# Patient Record
Sex: MALE | Race: WHITE | NOT HISPANIC OR LATINO | URBAN - METROPOLITAN AREA
[De-identification: names, ages, dates, MRNs, and addresses within clinical notes are randomized per-mention and may not be internally consistent; named-entity substitution may affect disease eponyms.]

---

## 2024-08-08 ENCOUNTER — REFERRAL (OUTPATIENT)
Dept: TRANSPLANT | Facility: CLINIC | Age: 64
End: 2024-08-08

## 2024-08-08 DIAGNOSIS — I25.10 CARDIOVASCULAR DISEASE: ICD-10-CM

## 2024-08-08 DIAGNOSIS — Z87.891 HISTORY OF TOBACCO USE: ICD-10-CM

## 2024-08-08 DIAGNOSIS — E11.9 DIABETES MELLITUS, TYPE 2 (H): ICD-10-CM

## 2024-08-08 DIAGNOSIS — Z76.82 ORGAN TRANSPLANT CANDIDATE: ICD-10-CM

## 2024-08-08 DIAGNOSIS — I10 ESSENTIAL HYPERTENSION: ICD-10-CM

## 2024-08-08 DIAGNOSIS — Z76.82 AWAITING ORGAN TRANSPLANT: Primary | ICD-10-CM

## 2024-08-08 DIAGNOSIS — G47.33 OBSTRUCTIVE SLEEP APNEA: ICD-10-CM

## 2024-08-08 DIAGNOSIS — E78.5 HYPERLIPIDEMIA: ICD-10-CM

## 2024-08-08 DIAGNOSIS — Z01.818 PRE-TRANSPLANT EVALUATION FOR KIDNEY TRANSPLANT: ICD-10-CM

## 2024-08-08 DIAGNOSIS — N18.4 CHRONIC KIDNEY DISEASE, STAGE IV (SEVERE) (H): ICD-10-CM

## 2024-08-08 DIAGNOSIS — N18.9 CHRONIC RENAL FAILURE: ICD-10-CM

## 2024-08-08 NOTE — LETTER
Zane King  [address] [facility] [address]          Dear Zane,    Thank you for your interest in the Transplant Center at [facility]. We look forward to being a part of your care team and assisting you through the transplant process.    As we discussed, your transplant coordinator is Michlele Colmenares (Kidney).  You may call your coordinator at any time with questions or concerns.  Your first scheduled call will be on 8/20/2024 between 12-3pm.  If this needs to change, call [phone].    Please complete the following.    Fill out and return the enclosed forms  Authorization for Electronic Communication  Authorization to Discuss Protected Health Information  Authorization for Release of Protected Health Information    Sign up for:  [employer], access to your electronic medical record (see enclosed pamphlet)  [employer], a transplant education website                                                                        My Transplant Place     You can use these tools to learn more about your transplant, communicate with your care team, and track your medical details      Sincerely,      Solid Organ Transplant  [facility]    cc: Referring Physician PCP

## 2024-08-08 NOTE — LETTER
Zane Leo King  [address] [facility] [address]                August 20, 2024    Mckinley Ontiveros,     It was a pleasure to speak with you over the phone today in preparation of your pre-kidney transplant evaluation. I am sending this email to review the pre-transplant information we covered.     A  from our Office will send your schedule in your My Chart for your pre-kidney transplant evaluation on November 11, 2024 starting at 7:30 AM. All your appointments will be at the:     [facility]  [address] [place] [id number]    For parking options, please park in the open lot across the street from the front door of our Clinic.  Otherwise, enter the [facility] / arrival plaza from [facility] and attendants can assist you based on your needs.  parking is available for those with limited mobility M-F from 7:00 am to 5:00 pm.     All in-person provider appointments will be on the third floor, 3A. Lab, EKG, and chest x-ray will be on the 1st floor. Echocardiogram will be on the 3rd floor. There are help desks in the clinic that can provide additional information, if you should need assistance.    Please bring your designated care person(s) to your appointment day to help listen to the patient education and ask questions that are important to you. You can eat/drink normally on this day. Please do not fast, as the appointment day will most likely go until 3 PM. There is a coffee shop on street level for you to purchase food and you can also bring food from home. Please be aware there are no microwaves or refrigerators for patient use at the clinic. Also, take all your prescribed medications as ordered on this day. There are no medication lab tests ordered during your appointments.    Upon completion of your appointments, I will compile the outcomes and have your results reviewed at the Transplant Team Selection Committee on Wednesday  of the following week. This is a medical review meeting only, you will not be asked to attend. I will call you within a few days after this meeting to inform you of the outcomes and to assist in planning for the completion of your evaluation.     You will receive an email from our Transplant Office which contains a Receipt of Information consent and patient educational materials. Please read and electronically sign the Receipt of Information consent as well as read the educational materials prior to your evaluation appointments on November 11, 2024.     Please complete your pre-kidney transplant education on My Transplant Place. This is an online website that our Transplant Program uses for patient education, specifically for our Program. To find My Transplant Place you can google search for it or click on this link: [email or web address].  You will find information for kidney transplant videos under the Organ Recipient Patient Education (Adult).  Find Kidney and select your preferred language.  The language link will send you to a [employer] video player. There are 15 short videos that are included in the SOT Pre-Transplant Training/Kidney 1. Please complete viewing of these videos prior to your evaluation appointments.  This will give you a good knowledge base to then to ask questions with the providers.       Additional transplant resources are as follows:   www.unos.org. UNOS, or United Network of Organ Sharing, is the national organization in our country that maintains all the organ wait lists and is responsible for the rules and regulations for organ allocation. I recommend looking at the Transplant Living section as this area has content created for patients.   www.srtr.org SRTR, or the Scientific Registry for Transplant Recipients is a national data base that all Transplant Centers report their success and failure rate for all organ types twice per year. The results are  public knowledge and do provide a good perspective of organ transplant.     If the transplant providers tell you at your appointments that you should start to have live donors register with our Program to initiate their evaluations, please provide this registration website: [email or web address]   The donor will receive a detailed email response back with information and next steps specific to their situation. It is important that the donor responds to the email in order to move forward with their evaluation. Donors can also call our Office and ask to speak with a live donor coordinator in the event of questions at [phone].     Please let me know of any questions or concerns.     Thank you,    Michelle Colmenares RN; Pre-Kidney/Pancreas Transplant Coordinator

## 2024-08-12 VITALS — BODY MASS INDEX: 37.52 KG/M2 | HEIGHT: 72 IN | WEIGHT: 277 LBS

## 2024-08-12 NOTE — TELEPHONE ENCOUNTER
SOT KIDNEY INTAKE   August 12, 2024 5:21 PM - Jaclyn Bran LPN:      PCP: Jewels Del Rio    Referring Organization: [facility]  Referring Provider: Zahida Lagos MD  Referring Diagnosis: CKD Stage 4    GFR/Date: 24 (1/30/2024)    Is patient diabetic? Yes  Is patient on insulin? Yes  Is patient under the age of 65? No  Was patient offered a pancreas transplant referral? 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 No   Does patient have a guardian? No    Referral intake process completed.  Patient is aware that after financial approval is received, medical records will be requested.   Patient confirmed for a callback from transplant coordinator on 8/20/2024. (within 2 weeks)  Tentative evaluation date 11/11/2024 slot 3 .    Confirmed coordinator will discuss evaluation process in more detail at the time of their call.   Patient is aware of the need to arrange age appropriate cancer screening, vaccinations, and dental care.  Reminded patient to complete questionnaire, complete medical records release, and review packet prior to evaluation visit .  Assessed patient for special needs (ie-wheelchair, assistance, guardian, and ): None    Patient instructed to call [phone] with questions.     Patient gave verbal consent during intake call to obtain medical records and documents outside of [facility]:  Yes

## 2024-08-19 ENCOUNTER — DOCUMENTATION ONLY (OUTPATIENT)
Dept: TRANSPLANT | Facility: CLINIC | Age: 64
End: 2024-08-19
Payer: COMMERCIAL

## 2024-08-20 NOTE — TELEPHONE ENCOUNTER
Reviewed pt's chart for pre-kidney transplant evaluation planning. Coordinator first call on 08/20/2024. PKE STD on 11/11/2024 slot C.      services required: No. Is pt able to attend virtual education class? Yes    Pt has CKD 4 2/2 , biopsy not performed. Qualifying GFR of 18 from February 2023 when the patient was hospitalized for several months with a COVID 19 infection.  Pt is not yet on dialysis. Pt is a type 2 diabetic, on insulin. He was diagnosed around 2005. Takes Lantus and 70/30 insulin twice daily. Last A1C January 2024 was 8.1.     Health hx: HLD, HTN, Hypogonadism, afib, DM2, Provoked DVT 2/2 COVID 19 and bedridden state. .    Heart hx: pafib, CAD s/p CABG 2016.  Watchman device placed. No AC.   Lung hx: MOMO.   Surgical hx: appendectomy as a child, penile implant, CABG in 2016.      Pt is not a smoker, does not consume alcohol, and does not use recreational drugs. Does not have current infection, non-healing wounds, or active cancer.    Health maintenance items: BMI 38.38 on 03/18/2024. Would be interested in participating in weight management as he has had a hard time losing weight with current strategies. Unable to add this visit to the PKE day. Colonoscopy: Due.  Dental: Due.  Last Vaccinations: Up to date pending virology panel.     Pt is independent w/ ADLs.  Pt lives in [place], [place] w/ his wife.  He feels he would have good support following transplant. Pt does not currently have any potential living donors.     Imaging Available: All three years old or older. Will need updated scans.     Contacted patient and introduced myself as their Transplant Coordinator, also introduced the role of the Transplant Coordinator in the transplant process.  Explained the purpose of this call including reviewing next steps and answering questions.      Confirmed Referring Provider, Yolis Teague; Dialysis Center, [facility]; and Primary Care Physician, Jewels Del Rio. Explained to the patient of the importance of  continued communication with referring providers and primary care physicians.      Reviewed components of transplant evaluation process including necessary appointments, tests, and procedures.  Instructed pt to bring 1-2 people with them to eval and to eat and drink and normally on eval day    Answered questions for patient regarding evaluation, provided my name and contact information and requested they call/message with any additional questions or concerns.  Informed patient they will receive a letter with information discussed in referral call. Determined that patient would like information regarding transplant by:       Mail, Email, [employer], and/or Phone Call.  Encouraged the use of [employer]    Informed pt about transplant educational website, asked to watch pre-kidney transplant videos or sign up for education class prior to evaluation. Link for educational videos were provided.  Additional informational web sites about transplant were discussed. Links provided to www.unos.org and www.srtr.org in letter sent to patient.  Pt expressed good understanding of all and were in good agreement with the plan.    Confirmed STD 11/11/2024 slot C. Informed pt they will hear from scheduling to arrange appointment times for evaluation day. As well as, receive an email from our Office prior to eval with a Receipt of Info and educational materials - instructed to read materials and sign consent prior to eval. Smartset orders entered.

## 2024-10-28 ENCOUNTER — TELEPHONE (OUTPATIENT)
Dept: TRANSPLANT | Facility: CLINIC | Age: 64
End: 2024-10-28
Payer: COMMERCIAL

## 2024-10-28 NOTE — TELEPHONE ENCOUNTER
GREGG 2-week call appointment confirmation:  Patient confirmed appt date/time: Yes  Support Person attending with Patient: Yes, Spouse  Insurance verification: Medicare  [facility] Reminder to bring ID and Insurance card to appointment   Services Needed: No, Language: English

## 2024-11-08 NOTE — PROGRESS NOTES
[facility] SOLID ORGAN TRANSPLANT  OUTPATIENT MNT: KIDNEY TRANSPLANT EVALUATION    Current BMI: 37.9 (HT 72 in,  lbs/127 kg)  BMI guideline for kidney transplant up to a BMI of 40 / per surgeon discretion     Frailty Assessment-- Not Frail (0/5 points)  Handgrip Strength: 38    Reference:  Score of 0-2 = Not Frail  Score of 3-5 = Frail      TIME SPENT: 30 minutes  VISIT TYPE: Initial   REFERRING PHYSICIAN: Kole   PT ACCOMPANIED BY: self     History of previous txp: none   Dialysis: no    NUTRITION ASSESSMENT  H/o DM II. He checks his BG 1x/week (fasting 101 last week).   Humulin 70/30, lantus (30 units)  Last A1c 9.2 (10/2024)- typically 6-7s     Was on ozempic for 6-12 months until 2-3 mos ago; reports he didn't like how he felt and did not lose weight  Was on another injectable drug as well     - Meal prep & grocery shopping: wife does some meal prep, but they often go out to eat   - Previous RD education: no  - Appetite: good/baseline  - Food allergies/intolerances: no  - Issues chewing or swallowing: no  - N/V/D/C: no  - Food access concerns: no    Vitamins, Supplements, Pertinent Meds: no  Herbal Medicines/Supplements: no  Protein Supplements: no    Weight hx // fluid retention:   - no [place]  - down 10 lbs intentionally x 1 year (at home 284-291), prev was >300 lbs     PHYSICAL ACTIVITY   Walks the dog 30 minutes/day- most days     DIET RECALL  Breakfast Waffles with PB and/or zero sugar syrup; cereal w/ [employer] milk   Lunch Hit or miss- similar to D    Dinner Out to eat most days- salad; soup & salad; steak   Some baked potato; vegetable if it comes with a meal- 50% of the time   Snacks Sugar free cookies, some donuts, some grapes/oranges/apples    Beverages Water, Diet 7up (1-2/day), some diet cranberry juice    Alcohol <1x/week    Dining out Most days      LABS  10/2 K 3.8   No recent Phos on file     NUTRITION DIAGNOSIS   No nutrition diagnosis identified at this time     NUTRITION  INTERVENTION   Nutrition education provided:  Discussed sodium intake (low sodium foods and drinks, seasoning food without salt and tips for low sodium diet).  Reviewed dining out in regard to sodium content of foods, general health with portion size, ongoing goal for weight loss, etc. Encouraged getting vegetables at most/all meals when dining out and saving half the portion for later.   Recommend protein food/snacks earlier in the day to help balance out meal consumption, etc.     Pt is not too interested in resuming injectable medications or weight loss surgery at this time.     Reviewed post txp diet guidelines in brief (will review in further detail post txp):  (1) Review of proper food safety measures d/t immunosuppressant therapy post-op and increased risk for food-borne illness    (2) Avoid the following post txp d/t risk for rejection, unknown effects on the organs, and/or potential interactions with immunosuppressants:  - Herbal, Chinese, holistic, chiropractic, natural, alternative medicines and supplements  - Detoxes and cleanses  - Weight loss pills  - Protein powders or other products with extracts or herbs (ie green tea extract)    (3) Med regimen and possible side effects    Patient Understanding: Pt verbalized understanding of education provided.  Expected Engagement: Good  Follow-Up Plans: PRN     NUTRITION GOALS   No nutrition goals identified at this time     Johanne Leonardo, RD, LD, CCTD

## 2024-11-10 LAB
ABO/RH(D): NORMAL
ANTIBODY SCREEN: NEGATIVE
SPECIMEN EXPIRATION DATE: NORMAL

## 2024-11-11 ENCOUNTER — APPOINTMENT (OUTPATIENT)
Dept: TRANSPLANT | Facility: CLINIC | Age: 64
End: 2024-11-11
Attending: NURSE PRACTITIONER
Payer: COMMERCIAL

## 2024-11-11 ENCOUNTER — ANCILLARY PROCEDURE (OUTPATIENT)
Dept: GENERAL RADIOLOGY | Facility: CLINIC | Age: 64
End: 2024-11-11
Attending: NURSE PRACTITIONER
Payer: COMMERCIAL

## 2024-11-11 ENCOUNTER — HOSPITAL ENCOUNTER (OUTPATIENT)
Dept: CARDIOLOGY | Facility: CLINIC | Age: 64
Discharge: HOME | End: 2024-11-11
Attending: NURSE PRACTITIONER
Payer: COMMERCIAL

## 2024-11-11 ENCOUNTER — ALLIED HEALTH/NURSE VISIT (OUTPATIENT)
Dept: TRANSPLANT | Facility: CLINIC | Age: 64
End: 2024-11-11

## 2024-11-11 ENCOUNTER — LAB (OUTPATIENT)
Dept: LAB | Facility: CLINIC | Age: 64
End: 2024-11-11
Attending: NURSE PRACTITIONER
Payer: COMMERCIAL

## 2024-11-11 VITALS
DIASTOLIC BLOOD PRESSURE: 76 MMHG | BODY MASS INDEX: 37.88 KG/M2 | HEART RATE: 75 BPM | HEIGHT: 72 IN | OXYGEN SATURATION: 95 % | SYSTOLIC BLOOD PRESSURE: 164 MMHG | WEIGHT: 279.7 LBS

## 2024-11-11 DIAGNOSIS — N18.4 CHRONIC KIDNEY DISEASE, STAGE IV (SEVERE) (H): ICD-10-CM

## 2024-11-11 DIAGNOSIS — Z01.818 PRE-TRANSPLANT EVALUATION FOR KIDNEY TRANSPLANT: ICD-10-CM

## 2024-11-11 DIAGNOSIS — Z12.5 ENCOUNTER FOR SCREENING FOR MALIGNANT NEOPLASM OF PROSTATE: ICD-10-CM

## 2024-11-11 DIAGNOSIS — N18.4 TYPE 2 DIABETES MELLITUS WITH STAGE 4 CHRONIC KIDNEY DISEASE, WITH LONG-TERM CURRENT USE OF INSULIN (H): ICD-10-CM

## 2024-11-11 DIAGNOSIS — Z79.4 TYPE 2 DIABETES MELLITUS WITH STAGE 4 CHRONIC KIDNEY DISEASE, WITH LONG-TERM CURRENT USE OF INSULIN (H): ICD-10-CM

## 2024-11-11 DIAGNOSIS — I25.810 CORONARY ARTERY DISEASE INVOLVING CORONARY BYPASS GRAFT OF NATIVE HEART WITHOUT ANGINA PECTORIS: ICD-10-CM

## 2024-11-11 DIAGNOSIS — Z87.891 HISTORY OF TOBACCO USE: ICD-10-CM

## 2024-11-11 DIAGNOSIS — N18.9 CHRONIC RENAL FAILURE: ICD-10-CM

## 2024-11-11 DIAGNOSIS — E78.5 HYPERLIPIDEMIA: ICD-10-CM

## 2024-11-11 DIAGNOSIS — E11.22 TYPE 2 DIABETES MELLITUS WITH STAGE 4 CHRONIC KIDNEY DISEASE, WITH LONG-TERM CURRENT USE OF INSULIN (H): ICD-10-CM

## 2024-11-11 DIAGNOSIS — Z76.82 ORGAN TRANSPLANT CANDIDATE: Primary | ICD-10-CM

## 2024-11-11 DIAGNOSIS — Z76.82 AWAITING ORGAN TRANSPLANT: ICD-10-CM

## 2024-11-11 DIAGNOSIS — E66.01 CLASS 2 SEVERE OBESITY DUE TO EXCESS CALORIES WITH SERIOUS COMORBIDITY AND BODY MASS INDEX (BMI) OF 37.0 TO 37.9 IN ADULT (H): ICD-10-CM

## 2024-11-11 DIAGNOSIS — N18.4 CHRONIC KIDNEY DISEASE, STAGE 4, SEVERELY DECREASED GFR (H): Primary | ICD-10-CM

## 2024-11-11 DIAGNOSIS — G47.33 OBSTRUCTIVE SLEEP APNEA: ICD-10-CM

## 2024-11-11 DIAGNOSIS — Z76.82 ORGAN TRANSPLANT CANDIDATE: ICD-10-CM

## 2024-11-11 DIAGNOSIS — E11.9 DIABETES MELLITUS, TYPE 2 (H): ICD-10-CM

## 2024-11-11 DIAGNOSIS — I25.10 CARDIOVASCULAR DISEASE: ICD-10-CM

## 2024-11-11 DIAGNOSIS — N18.4 CHRONIC KIDNEY DISEASE, STAGE 4, SEVERELY DECREASED GFR (H): ICD-10-CM

## 2024-11-11 DIAGNOSIS — I10 ESSENTIAL HYPERTENSION: ICD-10-CM

## 2024-11-11 DIAGNOSIS — Z01.818 PRE-TRANSPLANT EVALUATION FOR KIDNEY TRANSPLANT: Primary | ICD-10-CM

## 2024-11-11 DIAGNOSIS — Z11.59 ENCOUNTER FOR SCREENING FOR OTHER VIRAL DISEASES: ICD-10-CM

## 2024-11-11 DIAGNOSIS — E66.812 CLASS 2 SEVERE OBESITY DUE TO EXCESS CALORIES WITH SERIOUS COMORBIDITY AND BODY MASS INDEX (BMI) OF 37.0 TO 37.9 IN ADULT (H): ICD-10-CM

## 2024-11-11 LAB
A1 AB TITR SERPL: 64 {TITER}
A1 AB TITR SERPL: 8 {TITER}
ABO/RH(D): NORMAL
ALBUMIN SERPL BCG-MCNC: 4.3 G/DL (ref 3.5–5.2)
ALBUMIN UR-MCNC: 30 MG/DL
ALP SERPL-CCNC: 128 U/L (ref 40–150)
ALT SERPL W P-5'-P-CCNC: 19 U/L (ref 0–70)
ANION GAP SERPL CALCULATED.3IONS-SCNC: 16 MMOL/L (ref 7–15)
ANTIBODY TITER IGM SCREEN: NEGATIVE
APPEARANCE UR: CLEAR
APTT PPP: 32 SECONDS (ref 22–38)
AST SERPL W P-5'-P-CCNC: 20 U/L (ref 0–45)
B IGG TITR SERPL: 128 {TITER}
B IGM TITR SERPL: 16 {TITER}
BASOPHILS # BLD AUTO: 0.1 10E3/UL (ref 0–0.2)
BASOPHILS NFR BLD AUTO: 1 %
BILIRUB SERPL-MCNC: 0.6 MG/DL
BILIRUB UR QL STRIP: NEGATIVE
BUN SERPL-MCNC: 45.7 MG/DL (ref 8–23)
CALCIUM SERPL-MCNC: 9.4 MG/DL (ref 8.8–10.4)
CHLORIDE SERPL-SCNC: 104 MMOL/L (ref 98–107)
COLOR UR AUTO: ABNORMAL
CREAT SERPL-MCNC: 2.85 MG/DL (ref 0.67–1.17)
EGFRCR SERPLBLD CKD-EPI 2021: 24 ML/MIN/1.73M2
EOSINOPHIL # BLD AUTO: 0.1 10E3/UL (ref 0–0.7)
EOSINOPHIL NFR BLD AUTO: 2 %
ERYTHROCYTE [DISTWIDTH] IN BLOOD BY AUTOMATED COUNT: 14.1 % (ref 10–15)
EST. AVERAGE GLUCOSE BLD GHB EST-MCNC: 309 MG/DL
FACTOR 2 INTERPRETATION: NORMAL
FACTOR V INTERPRETATION: NORMAL
GLUCOSE SERPL-MCNC: 291 MG/DL (ref 70–99)
GLUCOSE UR STRIP-MCNC: 200 MG/DL
HBA1C MFR BLD: 12.4 %
HBV CORE AB SERPL QL IA: NONREACTIVE
HBV SURFACE AB SERPL IA-ACNC: <3.5 M[IU]/ML
HBV SURFACE AB SERPL IA-ACNC: NONREACTIVE M[IU]/ML
HBV SURFACE AG SERPL QL IA: NONREACTIVE
HCO3 SERPL-SCNC: 21 MMOL/L (ref 22–29)
HCT VFR BLD AUTO: 44.7 % (ref 40–53)
HCV AB SERPL QL IA: NONREACTIVE
HGB BLD-MCNC: 15.4 G/DL (ref 13.3–17.7)
HGB UR QL STRIP: ABNORMAL
HIV 1+2 AB+HIV1 P24 AG SERPL QL IA: NONREACTIVE
IMM GRANULOCYTES # BLD: 0.1 10E3/UL
IMM GRANULOCYTES NFR BLD: 1 %
INR PPP: 1.04 (ref 0.85–1.15)
KETONES UR STRIP-MCNC: NEGATIVE MG/DL
LAB DIRECTOR COMMENTS: NORMAL
LAB DIRECTOR DISCLAIMER: NORMAL
LAB DIRECTOR INTERPRETATION: NORMAL
LAB DIRECTOR METHODOLOGY: NORMAL
LAB DIRECTOR RESULTS: NORMAL
LEUKOCYTE ESTERASE UR QL STRIP: NEGATIVE
LOCATION OF TASK: NORMAL
LVEF ECHO: NORMAL
LYMPHOCYTES # BLD AUTO: 1.9 10E3/UL (ref 0.8–5.3)
LYMPHOCYTES NFR BLD AUTO: 20 %
MCH RBC QN AUTO: 29.7 PG (ref 26.5–33)
MCHC RBC AUTO-ENTMCNC: 34.5 G/DL (ref 31.5–36.5)
MCV RBC AUTO: 86 FL (ref 78–100)
MONOCYTES # BLD AUTO: 0.6 10E3/UL (ref 0–1.3)
MONOCYTES NFR BLD AUTO: 6 %
MUCOUS THREADS #/AREA URNS LPF: PRESENT /LPF
NEUTROPHILS # BLD AUTO: 6.7 10E3/UL (ref 1.6–8.3)
NEUTROPHILS NFR BLD AUTO: 71 %
NITRATE UR QL: NEGATIVE
NRBC # BLD AUTO: 0 10E3/UL
NRBC BLD AUTO-RTO: 0 /100
PH UR STRIP: 5.5 [PH] (ref 5–7)
PLATELET # BLD AUTO: 155 10E3/UL (ref 150–450)
POTASSIUM SERPL-SCNC: 3.4 MMOL/L (ref 3.4–5.3)
PROT SERPL-MCNC: 7.3 G/DL (ref 6.4–8.3)
PSA SERPL DL<=0.01 NG/ML-MCNC: 1.04 NG/ML (ref 0–4.5)
RBC # BLD AUTO: 5.19 10E6/UL (ref 4.4–5.9)
RBC URINE: 1 /HPF
SODIUM SERPL-SCNC: 141 MMOL/L (ref 135–145)
SP GR UR STRIP: 1.02 (ref 1–1.03)
SPECIMEN EXPIRATION DATE: NORMAL
SPECIMEN EXPIRATION DATE: NORMAL
SPECIMEN TYPE: NORMAL
T PALLIDUM AB SER QL: NONREACTIVE
UROBILINOGEN UR STRIP-MCNC: NORMAL MG/DL
WBC # BLD AUTO: 9.4 10E3/UL (ref 4–11)
WBC URINE: 0 /HPF

## 2024-11-11 PROCEDURE — 81001 URINALYSIS AUTO W/SCOPE: CPT

## 2024-11-11 PROCEDURE — G0463 HOSPITAL OUTPT CLINIC VISIT: HCPCS | Mod: 25 | Performed by: SURGERY

## 2024-11-11 PROCEDURE — 71046 X-RAY EXAM CHEST 2 VIEWS: CPT | Mod: GC | Performed by: RADIOLOGY

## 2024-11-11 PROCEDURE — G0103 PSA SCREENING: HCPCS

## 2024-11-11 PROCEDURE — 85025 COMPLETE CBC W/AUTO DIFF WBC: CPT

## 2024-11-11 PROCEDURE — 255N000002 HC RX 255 OP 636: Performed by: INTERNAL MEDICINE

## 2024-11-11 PROCEDURE — 999N000208 ECHOCARDIOGRAM COMPLETE

## 2024-11-11 PROCEDURE — 85610 PROTHROMBIN TIME: CPT

## 2024-11-11 PROCEDURE — 85390 FIBRINOLYSINS SCREEN I&R: CPT | Mod: 26 | Performed by: PATHOLOGY

## 2024-11-11 PROCEDURE — 83036 HEMOGLOBIN GLYCOSYLATED A1C: CPT

## 2024-11-11 PROCEDURE — 86704 HEP B CORE ANTIBODY TOTAL: CPT

## 2024-11-11 PROCEDURE — 85670 THROMBIN TIME PLASMA: CPT

## 2024-11-11 PROCEDURE — 81240 F2 GENE: CPT

## 2024-11-11 PROCEDURE — 86803 HEPATITIS C AB TEST: CPT

## 2024-11-11 PROCEDURE — 86787 VARICELLA-ZOSTER ANTIBODY: CPT

## 2024-11-11 PROCEDURE — 86706 HEP B SURFACE ANTIBODY: CPT

## 2024-11-11 PROCEDURE — 86886 COOMBS TEST INDIRECT TITER: CPT

## 2024-11-11 PROCEDURE — 86665 EPSTEIN-BARR CAPSID VCA: CPT

## 2024-11-11 PROCEDURE — G0452 MOLECULAR PATHOLOGY INTERPR: HCPCS | Mod: 26 | Performed by: PATHOLOGY

## 2024-11-11 PROCEDURE — 86644 CMV ANTIBODY: CPT

## 2024-11-11 PROCEDURE — 82040 ASSAY OF SERUM ALBUMIN: CPT

## 2024-11-11 PROCEDURE — 86780 TREPONEMA PALLIDUM: CPT

## 2024-11-11 PROCEDURE — 85730 THROMBOPLASTIN TIME PARTIAL: CPT

## 2024-11-11 PROCEDURE — 86481 TB AG RESPONSE T-CELL SUSP: CPT

## 2024-11-11 PROCEDURE — 81382 HLA II TYPING 1 LOC HR: CPT

## 2024-11-11 PROCEDURE — 82247 BILIRUBIN TOTAL: CPT

## 2024-11-11 PROCEDURE — 99204 OFFICE O/P NEW MOD 45 MIN: CPT | Performed by: SURGERY

## 2024-11-11 PROCEDURE — 85613 RUSSELL VIPER VENOM DILUTED: CPT

## 2024-11-11 PROCEDURE — 86147 CARDIOLIPIN ANTIBODY EA IG: CPT

## 2024-11-11 PROCEDURE — 86850 RBC ANTIBODY SCREEN: CPT

## 2024-11-11 PROCEDURE — 85014 HEMATOCRIT: CPT

## 2024-11-11 PROCEDURE — 84681 ASSAY OF C-PEPTIDE: CPT

## 2024-11-11 PROCEDURE — 93306 TTE W/DOPPLER COMPLETE: CPT | Mod: 26 | Performed by: INTERNAL MEDICINE

## 2024-11-11 PROCEDURE — 81379 HLA I TYPING COMPLETE HR: CPT

## 2024-11-11 PROCEDURE — 93000 ELECTROCARDIOGRAM COMPLETE: CPT | Performed by: INTERNAL MEDICINE

## 2024-11-11 PROCEDURE — 86900 BLOOD TYPING SEROLOGIC ABO: CPT

## 2024-11-11 PROCEDURE — 87340 HEPATITIS B SURFACE AG IA: CPT

## 2024-11-11 PROCEDURE — 36415 COLL VENOUS BLD VENIPUNCTURE: CPT

## 2024-11-11 RX ORDER — AMLODIPINE BESYLATE 10 MG/1
1 TABLET ORAL DAILY
COMMUNITY
Start: 2024-06-12

## 2024-11-11 RX ORDER — GABAPENTIN 100 MG/1
1 CAPSULE ORAL AT BEDTIME
COMMUNITY
Start: 2024-06-12

## 2024-11-11 RX ORDER — CHLORTHALIDONE 25 MG/1
25 TABLET ORAL
COMMUNITY
Start: 2024-04-24

## 2024-11-11 RX ORDER — CHOLECALCIFEROL (VITAMIN D3) 50 MCG
2000 TABLET ORAL
COMMUNITY
Start: 2024-01-29 | End: 2025-11-04

## 2024-11-11 RX ORDER — ATORVASTATIN CALCIUM 40 MG/1
1 TABLET, FILM COATED ORAL AT BEDTIME
COMMUNITY
Start: 2023-08-02

## 2024-11-11 RX ORDER — GUAIFENESIN AND DEXTROMETHORPHAN HYDROBROMIDE 100; 10 MG/5ML; MG/5ML
10 SOLUTION ORAL
COMMUNITY
Start: 2024-10-03

## 2024-11-11 RX ORDER — METOPROLOL SUCCINATE 100 MG/1
100 TABLET, EXTENDED RELEASE ORAL
COMMUNITY
Start: 2024-07-01

## 2024-11-11 RX ORDER — TESTOSTERONE 20.25 MG/1.25G
GEL TOPICAL
COMMUNITY
Start: 2023-11-16

## 2024-11-11 RX ORDER — CALCITRIOL 0.25 UG/1
0.25 CAPSULE, LIQUID FILLED ORAL
COMMUNITY
Start: 2024-08-07 | End: 2025-08-07

## 2024-11-11 RX ORDER — ASPIRIN 81 MG/1
81 TABLET ORAL
COMMUNITY

## 2024-11-11 RX ORDER — INSULIN GLARGINE 100 [IU]/ML
30 INJECTION, SOLUTION SUBCUTANEOUS AT BEDTIME
COMMUNITY

## 2024-11-11 RX ORDER — AMLODIPINE BESYLATE AND ATORVASTATIN CALCIUM 10; 10 MG/1; MG/1
TABLET, FILM COATED ORAL
COMMUNITY

## 2024-11-11 RX ADMIN — PERFLUTREN 4 ML: 6.52 INJECTION, SUSPENSION INTRAVENOUS at 15:26

## 2024-11-11 NOTE — NURSING NOTE
"Kidney Transplant Evaluation     Participants were informed of the benefits of transplant as well as potential risks such as infection, cancer, and death.  The need for total adherence with immunosuppression medications and following transplant regimens was stressed.  The overall evaluation/approval/listing process was reviewed.        The patient was provided with the following documents:  What You Need to Know About a Kidney Transplant  Adult Kidney Transplant - A Guide for Patients  SRTR Data Sheet - Kidney  Brochure - Kidney Allocation  Brochure - Multiple Listing and Waiting Time Transfer  What Every Patient Needs to Know (UNOS)  UNOS Facts and Figures  PI Consent  Receipt of Information form    Signed the  Receipt of Information for Organ Transplant Recipient.\" He was provided transplant coordinator's business card and instructed to call with additional questions.      Summary    Team s concerns/comments: Weight loss - goal to 265 lbs and re-evaluate per surgery, cardiac risk assessment, health maintenance needs to be updated    Candidacy category: No data was found    Action/Plan: continue with evaluation    Expected Selection Meeting Discussion: 11/20/2024    "

## 2024-11-11 NOTE — LETTER
11/11/2024      Zane King  [address] [facility] [place] [address]      Dear Colleague,    Thank you for referring your patient, Zane King, to the [facility]. Please see a copy of my visit note below.    Transplant Surgery Consult Note    Medical record number: [id number]  YOB: 1960,   Consult requested by Dr. Teague for evaluation of kidney transplant candidacy.    Assessment and Recommendations: Mr. King is a good candidate for transplantation and has a good understanding of the risks and benefits of this approach to management of renal failure and diabetes. The following issues should be addressed prior to transplant:     Mr. King has Chronic renal failure due to diabetes mellitus type 2 whose condition is not expected to resolve, is expected to progress, and is expected to continue to develop related comorbid conditions.  Cardiology consult for cardiac risk stratification to be ordered: Yes  CT abdomen and pelvis without contrast to be ordered for assessment of vascular targets: Yes  Transplant listing labs ordered to include HLA, ABOx2, Cr, etc.  Dietician consult ordered: Yes  Social work consult ordered: Yes  Imaging reports reviewed: No recent imaging available for review  Radiology images reviewed: No recent imaging available for review  Recipient suitable to move forward with work up of living donors:  Yes  Needs to improve HgbA1c to <8.5%  Derm recommendations on waiting time. Recently had melanoma excised on R ear  Updated colonoscopy  Lose weight down to 265 and reassess  Weight management clinic for assistance with weight loss.  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  Was on anticoagulation for that and atrial fibrillation however has had Watchman procedure and now off anticoagulation.  Question of anticoagulation associated nephropathy  however there were no red casts on the biopsy and therefore this was in question.  He does note an improvement in renal function with cessation of warfarin.  Will likely need chemical DVT prophylaxis at the time of surgery given his history of provoked DVTs.  Patient states he was worked up for hypercoagulable disorder however I cannot find the notes from this.  Would appreciate reviewing hematology workup for hypercoagulability.  If he has not had this he should see our hematologist.      The majority of our visit was spent in counselling, discussing the medical and surgical risks of living or  donor kidney and pancreas transplantation, either in a simultaneous or sequential fashion. I contrasted approximate wait time for SPK vs living vs  donor kidneys from normal (0-85%) or higher (%) kidney donor profile index (KDPI) donors and their associated outcomes. I would not recommend this individual to consider kidneys from high KDPI donors. The reason for this decision is best summarized as: not on dialysis yet.  Access to transplant will be impacted by living donor availability and overall candidacy for SPK, as well as the influence of blood type and degree of sensitization. We discussed advantages of preemptive transplant as well as living donor kidney transplant, and graft and patient survival outcomes associated with these options. Potential surgical complications of kidney and pancreas transplantation include bleeding, clotting, infection, wound complications, anastomotic failure and other issues such as cardiac complications, pneumonia, deep venous thrombosis, pulmonary embolism, post transplant diabetes and death. We discussed the need for protocol biopsy of the kidney and the possible need for a ureteral stent (and subsequent removal). We discussed benefits and risks associated with different approaches to exocrine drainage of pancreatic secretions. We also discussed differences in the  "average length of stay, recovery process, and posttransplant lab and monitoring protocol. We discussed the risk of graft rejection and recurrent diabetic nephropathy in the setting of poor glycemic control. I emphasized the need for strict immunosuppression adherence and the potential for complications of immunosuppression such as skin cancer or lymphoma, as well as a very low but not zero risk of donor-derived disease transmission risks (infection, cancer). Mr. King asked good questions and the patient's candidacy will be reviewed at our Multidisciplinary Selection Committee. Thank you for the opportunity to participate in Mr. King's care.    Total time: 60 minutes        Aparna Sharif MD FACS  Associate Professor of Surgery  Director, Living Kidney Donor Program.  ---------------------------------------------------------------------------------------------------    HPI: Mr. King has Chronic renal failure due to diabetes mellitus type 2. The patient has had diabetes for 19 years. Management is by Lantus 30 units at bedtime  Novolog Mix 70-30 30 units twice daily . The patient usually checks his blood sugar 1 times every 2 weeks.  Daily blood glucoses range typically from 80 to \"I don't know\".  Hypoglyemic unawareness is not an issue.  The diabetes is uncontrolled.    Complications of diabetes include:    Retinopathy:  No  Neuropathy: No  Gastroparesis:  No    The patient is not on dialysis.    Has potential kidney donors:  Yes .  Interested in participation in paired exchange if a donor is willing: Yes     The patient has the following pertinent history:       No    Yes  Dialysis:    [x]      [] via:       Blood Transfusion                  [x]      []  Number of units:   Most recently:  Pregnancy:    [x]      [] Number:       Previous Transplant:  [x]      [] Details:    Cancer    []      [x] Comment: melanoma R ear  Kidney stones   [x]      [] Comment:      Recurrent infections  [x]      [] "  Type:                  Bladder dysfunction  [x]      [] Cause:    Claudication   [x]      [] Distance:    Previous Amputation  [x]      [] Cause:     Chronic anticoagulation  [x]      [] Indication:  Congregational  [x]      []     Past Medical History:   Diagnosis Date     Diabetes (H)      Hypertension      Past Surgical History:   Procedure Laterality Date     IR CVC TUNNEL W2 CATH W/O PORT  5/10/2021     IR LUMBAR PUNCTURE  5/7/2021   CABG x 4 2015  Umbilical hernia repair with mesh  Penile implant  Coronary stenting    Family History   Problem Relation Age of Onset     Coronary Artery Disease Father      Diabetes Type 2  Maternal Grandmother      Social History     Socioeconomic History     Marital status:      Spouse name: Not on file     Number of children: Not on file     Years of education: Not on file     Highest education level: Not on file   Occupational History     Not on file   Tobacco Use     Smoking status: Former     Types: Cigarettes     Smokeless tobacco: Never     Tobacco comments:     Quit smoking 18-20 years ago    Substance and Sexual Activity     Alcohol use: Yes     Comment: Rarely, 1 drink evry 2-3wks     Drug use: Never     Sexual activity: Not on file   Other Topics Concern     Parent/sibling w/ CABG, MI or angioplasty before 65F 55M? No   Social History Narrative     Not on file     Social Drivers of Health     Financial Resource Strain: Low Risk  (10/2/2024)    [facility]    Financial Resource Strain      Difficulty of Paying Living Expenses: 3      Difficulty of Paying Living Expenses: Not on file   Food Insecurity: No Food Insecurity (10/2/2024)    [facility]    Food Insecurity      Do you worry your food will run out before you are able to buy more?: 1   Transportation Needs: No Transportation Needs (10/2/2024)    [facility]     Transportation Needs      Does lack of transportation keep you from medical appointments?: 1      Does lack of transportation keep you from work, meetings or getting things that you need?: 1   Physical Activity: Not on file   Stress: Not on file   Social Connections: Socially Integrated (10/2/2024)    [facility]    Social Connections      Do you often feel lonely or isolated from those around you?: 0   Interpersonal Safety: Unknown (2/6/2024)    Received from [facility]    Humiliation, Afraid, Rape, and Kick questionnaire      Fear of Current or Ex-Partner: Not on file      Emotionally Abused: Not on file      Physically Abused: No      Sexually Abused: No   Housing Stability: Low Risk  (10/2/2024)    Received from [facility]    Housing Stability      What is your housing situation today?: 1       ROS:   CONSTITUTIONAL:  No fevers or chills  EYES: negative for icterus  ENT:  negative for hearing loss, tinnitus and sore throat  RESPIRATORY:  negative for cough, sputum, dyspnea  CARDIOVASCULAR:  negative for chest pain Fatigue  GASTROINTESTINAL:  negative for nausea, vomiting, diarrhea or constipation  GENITOURINARY:  negative for incontinence, dysuria, bladder emptying problems  HEME:  No easy bruising  INTEGUMENT:  negative for rash and pruritus  NEURO:  Negative for headache, seizure disorder    Allergies:   No Known Allergies    Medications:  Prescription Medications as of 11/11/2024         Rx Number Disp Refills Start End Last Dispensed Date Next Fill Date Owning Pharmacy    amLODIPine (NORVASC) 10 MG tablet  -- -- 6/12/2024 --       Sig: Take 1 tablet by mouth daily.    Class: Historical    Route: Oral    amLODIPine-atorvastatin (CADUET) 10-10 MG tablet  -- --  --       Class: Historical    aspirin 81 MG EC tablet  -- --  --       Sig: Take 81 mg by mouth.    Class: Historical    Route: Oral    atorvastatin (LIPITOR) 40 MG  tablet  -- -- 8/2/2023 --       Sig: Take 1 tablet by mouth at bedtime.    Class: Historical    Route: Oral    calcitRIOL (ROCALTROL) 0.25 MCG capsule  -- -- 8/7/2024 8/7/2025       Sig: Take 0.25 mcg by mouth.    Class: Historical    Route: Oral    chlorthalidone (HYGROTON) 25 MG tablet  -- -- 4/24/2024 --       Sig: Take 25 mg by mouth.    Class: Historical    Route: Oral    Dextromethorphan-guaiFENesin  MG/5ML syrup  -- -- 10/3/2024 --       Sig: Take 10 mLs by mouth.    Class: Historical    Route: Oral    gabapentin (NEURONTIN) 100 MG capsule  -- -- 6/12/2024 --       Sig: Take 1 capsule by mouth at bedtime.    Class: Historical    Route: Oral    insulin glargine (LANTUS VIAL) 100 UNIT/ML vial  -- --  --       Sig: Inject 30 Units subcutaneously at bedtime.    Class: Historical    Route: Subcutaneous    insulin NPH-Regular 70/30 (HUMULIN 70/30;NOVOLIN 70/30) (70-30) 100 UNIT/ML vial  -- -- 2/2/2024 2/1/2025       Sig: Inject 30 Units subcutaneously.    Class: Historical    Route: Subcutaneous    metoprolol succinate ER (TOPROL XL) 100 MG 24 hr tablet  -- -- 7/1/2024 --       Sig: Take 100 mg by mouth.    Class: Historical    Route: Oral    Testosterone 1.62 % GEL  -- -- 11/16/2023 --       Sig: APPLY 1 PUMP TRANSDERMALLY ONCE DAILY    Class: Historical    Vitamin D3 50 mcg (2000 units) tablet  -- -- 1/29/2024 11/4/2025       Sig: Take 2,000 Units by mouth.    Class: Historical    Route: Oral            Exam:   Pulse:  [75] 75  BP: (164)/(76) 164/76  SpO2:  [95 %] 95 %  Appearance: in no apparent distress.   Skin: normal  Head and Neck: Normal, no rashes or jaundice  Respiratory: easy respirations, no audible wheezing.  Cardiovascular: RRR  Abdomen: rounded, obese, and protuberant, No distention, Surgical scars consistent with history, and anterior superior iliac spine barely palpable  Extremities: femoral difficult to palpate bilaterally and bilaterally secondary to body habitus/, Edema, none  Neuro:  "without deficit     Diagnostics:   No results found for this or any previous visit (from the past 4 weeks).  No results found for: \"CPRA\"       Again, thank you for allowing me to participate in the care of your patient.        Sincerely,        Aparna Sharif MD, MD  "

## 2024-11-11 NOTE — PROGRESS NOTES
TRANSPLANT NEPHROLOGY RECIPIENT EVALUATION NOTE    Assessment and Plan:  # Kidney Transplant Evaluation: Patient is a good candidate overall. Benefits of a living donor transplant were discussed.  -Cardiac risk assessment  -Weight loss  -Refer to Endocrinology for uncontrolled diabetes  -Hep B vaccine    # CKD from diabetes mellitus type 2: ***  Qualifying GFR of 18 from February 2023 when the patient was hospitalized for several months with a COVID 19 infection.    # DM Type 2: *** Rarely check. CGM not use. Use fingerstick 1/week. PytG2t=08.4    # Cardiac Risk: ***  CABG  2015?    # PAD Screening: {FV TX RENAL PAD SCREENING (Optional):892142}    # HTN: *** 140s. Controlled on     # Obesity, Central : BMI 37.9, recommend weight goal of 265lbs. Per surgeon    # ***: ***    # Health Maintenance: Colonoscopy: Not up to date, Dermatology: Up to date, and Dental: Not up to date    - Discussed the risks and benefits of a transplant, including the risk of surgery and immunosuppression medications.  Patient's overall evaluation will be discussed in the Transplant Program's regular meeting with a final recommendation on the patients suitability for transplant to be made at that time.    Pending completion of the full evaluation, patient presently appears to be enough of an acceptable kidney transplant recipient candidate to have any potential kidney donors start the evaluation process.    Evaluation:  Zane King was seen in consultation at the request of Dr. Aparna Sharif for evaluation as a potential kidney transplant recipient.    Reason for Visit:  Zane King is a 64 year old male with ESKD from diabetes mellitus type 2, who presents for kidney transplant evaluation.    History of Present Illness:   ***         Kidney Disease Hx:        ***       Kidney Disease Dx: { :985995}       Biopsy Proven: {YES WITH WILD CARD/NO:11466500}  Kidney Biopsy 3/14/22 : Early nodular glomerulosclerosis.  Moderate to marked global glomerulosclerosis (14/33). Acute tubular injury. Marked interstitial fibrosis and tubular atrophy. Mild interstitial nephritis. Mild arterial sclerosis. Focal marked arteriolar hyalinosis.Warfarin nephropathy is not excluded but also cannot be confirmed as extensive red cell casts are not identified.        On Dialysis: No       Primary Nephrologist: Zahida       H/o Kidney Stones: No       H/o Recurrent/Frequent UTI: No         Diabetic Hx: Type 2        Diagnosis Date: 18-19 years       Medication History: pills,metformin. Humulin 70/30 30 units BID. Lantus 30unuts QHS       Diabetic Control: Poorly controlled (HbA1c >9%)   Last HbA1c: 12.4%       Hypoglycemic Unawareness: No       End-Organ Damage due to DM: Nephropathy and Peripheral neuropathy          Cardiac/Vascular Disease Risk Factors:        Cardiac Risk Factors: {Cardiac Risk Factors:489133}       Known CAD: {YES WITH WILD CARD/NO:79355059}       Known PAD/Caludication Symptoms: {YES WITH WILD CARD/NO:70331700}       Known Heart Failure: {Cardiac Risk Factors:243095}       Arrhythmia: Yes; Hx of Afib, [facility] 2022       Pulmonary Hypertension: {YES WITH WILD CARD/NO:93837630}       Valvular Disease: {YES WITH WILD CARD/NO:54504102}       Other: {Cardiac Risk Factors:364221}         Viral Serology Status       CMV IgG Antibody: Positive       EBV IgG Antibody: Positive         Volume Status/Weight:        Volume status: { :550714}       Weight:  {FV RENAL TX Recip Eval Weight:987642}       BMI: There is no height or weight on file to calculate BMI.         Functional Capacity/Frailty:        ***    Fatigue/Decreased Energy: [] No [x] Yes    Chest Pain or SOB with Exertion: [x] No [] Yes    Significant Weight Change: [x] No [] Yes    Nausea, Vomiting or Diarrhea: [x] No [] Yes    Fever, Sweats or Chills:  [x] No [] Yes    Leg Swelling [x] No [] Yes        History of Cancer:   Melanoma: ear    Other Significant Medical Issues:  {None/***:845277}    Allergy Testing Questions:   Medication that caused a reaction None   Antibiotics used that didn't give an allergic reaction?  Patient doesn't know    COVID Vaccination Up To Date: No    Potential Living Kidney Donors: Not sure    Review of Systems:  A comprehensive review of systems was obtained and negative, except as noted in the HPI or PMH.    Past Medical History:   Medical record was reviewed and PMH was discussed with patient and noted below.  Past Medical History:   Diagnosis Date    Diabetes (H)     Hypertension        Past Social History:   Past Surgical History:   Procedure Laterality Date    IR CVC TUNNEL W2 CATH W/O PORT  5/10/2021    IR LUMBAR PUNCTURE  5/7/2021     Personal history of bleeding or anesthesia problems: No    Family History:  No family history on file.    Personal History:   Social History     Socioeconomic History    Marital status:      Spouse name: Not on file    Number of children: Not on file    Years of education: Not on file    Highest education level: Not on file   Occupational History    Not on file   Tobacco Use    Smoking status: Former     Types: Cigarettes    Smokeless tobacco: Never    Tobacco comments:     Quit smoking 18-20 years ago    Substance and Sexual Activity    Alcohol use: Yes     Comment: Rarely, 1 drink evry 2-3wks    Drug use: Never    Sexual activity: Not on file   Other Topics Concern    Parent/sibling w/ CABG, MI or angioplasty before 65F 55M? No   Social History Narrative    Not on file     Social Drivers of Health     Financial Resource Strain: Low Risk  (10/2/2024)    Received from [facility]    Financial Resource Strain     Difficulty of Paying Living Expenses: 3     Difficulty of Paying Living Expenses: Not on file   Food Insecurity: No Food Insecurity (10/2/2024)    Received from [facility]    Food Insecurity     Do you worry your food will run out before  you are able to buy more?: 1   Transportation Needs: No Transportation Needs (10/2/2024)    [facility]    Transportation Needs     Does lack of transportation keep you from medical appointments?: 1     Does lack of transportation keep you from work, meetings or getting things that you need?: 1   Physical Activity: Not on file   Stress: Not on file   Social Connections: Socially Integrated (10/2/2024)    Received from [facility] [email or web address] [facility]    Social Connections     Do you often feel lonely or isolated from those around you?: 0   Interpersonal Safety: Unknown (2/6/2024)    [facility]    Humiliation, Afraid, Rape, and Kick questionnaire     Fear of Current or Ex-Partner: Not on file     Emotionally Abused: Not on file     Physically Abused: No     Sexually Abused: No   Housing Stability: Low Risk  (10/2/2024)    Received from [facility]    Housing Stability     What is your housing situation today?: 1       Allergies:  No Known Allergies    Medications:  Current Outpatient Medications   Medication Sig Dispense Refill    amLODIPine (NORVASC) 10 MG tablet Take 1 tablet by mouth daily.      amLODIPine-atorvastatin (CADUET) 10-10 MG tablet       aspirin 81 MG EC tablet Take 81 mg by mouth.      atorvastatin (LIPITOR) 40 MG tablet Take 1 tablet by mouth at bedtime.      calcitRIOL (ROCALTROL) 0.25 MCG capsule Take 0.25 mcg by mouth.      chlorthalidone (HYGROTON) 25 MG tablet Take 25 mg by mouth.      Dextromethorphan-guaiFENesin  MG/5ML syrup Take 10 mLs by mouth.      gabapentin (NEURONTIN) 100 MG capsule Take 1 capsule by mouth at bedtime.      insulin NPH-Regular 70/30 (HUMULIN 70/30;NOVOLIN 70/30) (70-30) 100 UNIT/ML vial Inject 30 Units subcutaneously.      metoprolol succinate ER (TOPROL XL) 100 MG 24 hr tablet Take 100 mg by mouth.      Testosterone 1.62 % GEL APPLY 1 PUMP TRANSDERMALLY  ONCE DAILY      Vitamin D3 50 mcg (2000 units) tablet Take 2,000 Units by mouth.       No current facility-administered medications for this visit.       Vitals:  There were no vitals taken for this visit.    Exam:  GENERAL APPEARANCE: alert and no distress  HENT: mouth without ulcers or lesions  RESP: lungs clear to auscultation - no rales, rhonchi or wheezes  CV: regular rhythm, normal rate, no rub, no murmur  EDEMA: no LE edema bilaterally  ABDOMEN: soft, nondistended, nontender, bowel sounds normal  MS: extremities normal - no gross deformities noted, no evidence of inflammation in joints, no muscle tenderness  SKIN: no rash    Results:   No results found for this or any previous visit (from the past 2 weeks).         mcg (2000 units) tablet Take 2,000 Units by mouth.       No current facility-administered medications for this visit.       Vitals:     /76 (Abnormal)     Pulse 75   SpO2 95 %   Weight 126.9 kg (279 lb 11.2 oz)   Height 1.829 m (6')   BMI (Calculated) 37.93     Exam:  GENERAL APPEARANCE: alert and no distress  HENT: mouth without ulcers or lesions  RESP: lungs clear to auscultation - no rales, rhonchi or wheezes  CV: regular rhythm, normal rate, no rub, no murmur  EDEMA: no LE edema bilaterally  ABDOMEN: soft, nondistended, nontender, bowel sounds normal  MS: extremities normal - no gross deformities noted, no evidence of inflammation in joints, no muscle tenderness  SKIN: no rash    Results:   Recent Results (from the past 2 weeks)   Prostate spec antigen screen [SOF0704]    Collection Time: 11/11/24  2:29 AM   Result Value Ref Range    Prostate Specific Antigen Screen 1.04 0.00 - 4.50 ng/mL   Hemoglobin A1c [LAB90]    Collection Time: 11/11/24  2:29 AM   Result Value Ref Range    Estimated Average Glucose 309 (H) <117 mg/dL    Hemoglobin A1C 12.4 (H) <5.7 %   C-peptide [NZN657]    Collection Time: 11/11/24  2:29 AM   Result Value Ref Range    C Peptide 2.9 0.9 - 6.9 ng/mL    Patient Fasting > 8hrs? Unknown    Varicella Zoster Virus Antibody IgG [MOX2146]    Collection Time: 11/11/24  2:29 AM   Result Value Ref Range    Varicella Zoster Virus Antibody IgG Interpretation Positive     Varicella Zoster Virus Antibody IgG Instrument Value 26.90 <1.00 S/CO   Treponema Abs w Reflex to RPR and Titer [[id number]]    Collection Time: 11/11/24  2:29 AM   Result Value Ref Range    Treponema Antibody Total Nonreactive Nonreactive   HIV Antigen Antibody Combo Pretransplant Cascade    Collection Time: 11/11/24  2:29 AM   Result Value Ref Range    HIV Antigen Antibody Combo Pretransplant Nonreactive Nonreactive   Hepatitis C antibody [TAZ001]    Collection Time: 11/11/24  2:29 AM   Result Value Ref Range    Hepatitis C  Antibody Nonreactive Nonreactive   Hepatitis B surface antigen [FJU271]    Collection Time: 11/11/24  2:29 AM   Result Value Ref Range    Hepatitis B Surface Antigen Nonreactive Nonreactive   Hepatitis B Surface Antibody [EAB8712]    Collection Time: 11/11/24  2:29 AM   Result Value Ref Range    Hepatitis B Surface Antibody Nonreactive     Hepatitis B Surface Antibody Instrument Value <3.50 <8.5 m[IU]/mL   Hepatitis B core antibody [HCT1913]    Collection Time: 11/11/24  2:29 AM   Result Value Ref Range    Hepatitis B Core Antibody Total Nonreactive Nonreactive   EBV Capsid Antibody IgG [IKK9038]    Collection Time: 11/11/24  2:29 AM   Result Value Ref Range    EBV Capsid Alysa IgG Instrument Value >750.0 (H) <18.0 U/mL    EBV Capsid Antibody IgG Positive (A) No detectable antibody.   CMV Antibody IgG [SYZ0221]    Collection Time: 11/11/24  2:29 AM   Result Value Ref Range    CMV Alysa IgG Instrument Value 3.70 (H) <0.60 U/mL    CMV Antibody IgG Positive, suggests recent or past exposure. (A) No detectable antibody.    Thrombin time [QLK145]    Collection Time: 11/11/24  2:29 AM   Result Value Ref Range    Thrombin Time 18.1 13.0 - 19.0 Seconds   Partial thromboplastin time [LAB56]    Collection Time: 11/11/24  2:29 AM   Result Value Ref Range    aPTT 32 22 - 38 Seconds   Lupus Anticoagulant Panel [ZUR9769]    Collection Time: 11/11/24  2:29 AM   Result Value Ref Range    PTT Ratio 1.14 <1.30    DRVVT Screen Ratio 0.93 <1.08    Lupus Result Negative Negative    Lupus Interpretation       The INR is normal.  APTT ratio is normal.    DRVVT Screen ratio is normal.  Thrombin time is normal.  NEGATIVE TEST; A LUPUS ANTICOAGULANT WAS NOT DETECTED IN THIS SPECIMEN WITHIN THE LIMITS OF THE TESTING REPERTOIRE.  If the clinical picture is strongly suggestive of an antiphospholipid syndrome, recommend anticardiolipin and beta-2-glycoprotein (IgG and IgM) antibody tests.    Naye Humphreys MD, PhD  [facility]     INR  [MRM2728]    Collection Time: 11/11/24  2:29 AM   Result Value Ref Range    INR 1.04 0.85 - 1.15   Cardiolipin Alysa IgG and IgM [LAB 6836]    Collection Time: 11/11/24  2:29 AM   Result Value Ref Range    Cardiolipin Alysa IgG Instrument Value 7.2 <10.0 GPL-U/mL    Cardiolipin Antibody IgG Negative Negative    Cardiolipin Alysa IgM Instrument Value <2.0 <10.0 MPL-U/mL    Cardiolipin Antibody IgM Negative Negative   Comprehensive metabolic panel [LAB17]    Collection Time: 11/11/24  2:29 AM   Result Value Ref Range    Sodium 141 135 - 145 mmol/L    Potassium 3.4 3.4 - 5.3 mmol/L    Carbon Dioxide (CO2) 21 (L) 22 - 29 mmol/L    Anion Gap 16 (H) 7 - 15 mmol/L    Urea Nitrogen 45.7 (H) 8.0 - 23.0 mg/dL    Creatinine 2.85 (H) 0.67 - 1.17 mg/dL    GFR Estimate 24 (L) >60 mL/min/1.73m2    Calcium 9.4 8.8 - 10.4 mg/dL    Chloride 104 98 - 107 mmol/L    Glucose 291 (H) 70 - 99 mg/dL    Alkaline Phosphatase 128 40 - 150 U/L    AST 20 0 - 45 U/L    ALT 19 0 - 70 U/L    Protein Total 7.3 6.4 - 8.3 g/dL    Albumin 4.3 3.5 - 5.2 g/dL    Bilirubin Total 0.6 <=1.2 mg/dL   Antibody titer red cell [[id number]]    Collection Time: 11/11/24  2:29 AM   Result Value Ref Range    Anti-A1 IgG Titer 64     Anti-A1 IgM Titer 8     Anti-B IgG Titer 128     Anti-B IgM Titer 16     SPECIMEN EXPIRATION DATE [id number]     ANTIBODY TITER IGM SCREEN Negative    Factor 2 and 5 mutation analysis    Collection Time: 11/11/24  2:29 AM   Result Value Ref Range    METHODOLOGY       The regions of genomic DNA containing the F5 gene mutation R506Q(1691G>A) and the Factor 2 (Prothrombin [id number]) gene mutation were simultaneously amplified using the polymerase chain reaction. The amplified products were digested with restriction endonuclease TaqI and products were analyzed by gel electrophoresis.        RESULTS         Factor V 1691G>A ([place])  RESULTS:  Mutation analyzed: 1691G>A  Factor V 1691G>A ([place])  Interpretation:  ABSENT  Factor V 1691G>A ([place])  mutation  genotype:  G/G    FACTOR 2/PROTHROMBIN RESULTS:  Mutation analyzed: 84968P>A  Factor 2 Mutation Interpretation:  ABSENT  Factor 2 Mutation genotype:  G/G        INTERPRETATION       The patient is negative for the Factor V 1691G>A ([place]) and negative for the Factor 2 mutation.  (Electronically signed by: Ventura Guaman MD November 15, 2024 2:24 PM)      COMMENTS       If a patient is the recipient of an allogeneic bone marrow transplant, this test must be done on a pre-transplant sample or buccal swab.  A previous allogeneic bone marrow transplant will interfere with test results.  Call the [facility] ([phone]) for instructions on sample collection for these patients.      DISCLAIMER       This test was developed and its performance characteristics determined by [facility]. It has not been cleared or approved by the FDA. The laboratory is regulated under CLIA as qualified to perform high-complexity testing. This test is used for clinical purposes. It should not be regarded as investigational or for research.    A resident/fellow in an accredited training program was involved in the selection of testing, review of laboratory data, and/or interpretation of this case.  I, as the senior physician, attest that I: (i) confirmed appropriate testing, (ii) examined the relevant raw data for the specimen(s); and (iii) rendered or confirmed the interpretation(s).        FACTOR 2 INTERPRETATION         Factor 2 Mutation Interpretation:  ABSENT      FACTOR V INTERPRETATION       Factor V 1691G>A ([place])  Interpretation:  ABSENT      Signout Location if Remote Report signed out at:   [facility] [place]     Specimen Description       Blood: ACD     Quantiferon TB Gold Plus Grey Tube    Collection Time: 11/11/24  2:29 AM    Specimen: Peripheral Blood   Result Value Ref Range    Quantiferon Nil Tube 0.02 IU/mL   Quantiferon TB Gold Plus Green Tube    Collection Time: 11/11/24   2:29 AM    Specimen: Peripheral Blood   Result Value Ref Range    Quantiferon TB1 Tube 0.04 IU/mL   Quantiferon TB Gold Plus Yellow Tube    Collection Time: 11/11/24  2:29 AM    Specimen: Peripheral Blood   Result Value Ref Range    Quantiferon TB2 Tube 0.02    Quantiferon TB Gold Plus Purple Tube    Collection Time: 11/11/24  2:29 AM    Specimen: Peripheral Blood   Result Value Ref Range    Quantiferon Mitogen 10.00 IU/mL   CBC with platelets and differential    Collection Time: 11/11/24  2:29 AM   Result Value Ref Range    WBC Count 9.4 4.0 - 11.0 10e3/uL    RBC Count 5.19 4.40 - 5.90 10e6/uL    Hemoglobin 15.4 13.3 - 17.7 g/dL    Hematocrit 44.7 40.0 - 53.0 %    MCV 86 78 - 100 fL    MCH 29.7 26.5 - 33.0 pg    MCHC 34.5 31.5 - 36.5 g/dL    RDW 14.1 10.0 - 15.0 %    Platelet Count 155 150 - 450 10e3/uL    % Neutrophils 71 %    % Lymphocytes 20 %    % Monocytes 6 %    % Eosinophils 2 %    % Basophils 1 %    % Immature Granulocytes 1 %    NRBCs per 100 WBC 0 <1 /100    Absolute Neutrophils 6.7 1.6 - 8.3 10e3/uL    Absolute Lymphocytes 1.9 0.8 - 5.3 10e3/uL    Absolute Monocytes 0.6 0.0 - 1.3 10e3/uL    Absolute Eosinophils 0.1 0.0 - 0.7 10e3/uL    Absolute Basophils 0.1 0.0 - 0.2 10e3/uL    Absolute Immature Granulocytes 0.1 <=0.4 10e3/uL    Absolute NRBCs 0.0 10e3/uL   Adult Type and Screen    Collection Time: 11/11/24  2:29 AM   Result Value Ref Range    ABO/RH(D) O POS     Antibody Screen Negative Negative    SPECIMEN EXPIRATION DATE [id number]    Quantiferon TB Gold Plus    Collection Time: 11/11/24  2:29 AM    Specimen: Peripheral Blood   Result Value Ref Range    Quantiferon-TB Gold Plus Negative Negative    TB1 Ag minus Nil Value 0.02 IU/mL    TB2 Ag minus Nil Value 0.00 IU/mL    Mitogen minus Nil Result 9.98 IU/mL    Nil Result 0.02 IU/mL   HLA-ABC Typing High Resolution    Collection Time: 11/11/24  2:29 AM   Result Value Ref Range    ABTEST METHOD NGS     hirblanquitaA-1 A*02:01     Seth-1Equiv 2      hiresA-2 A*24:02     hiresA-2Equiv 24     hiresB-1 B*40:01     hiresB-1Equiv 60     hiresB-2 B*49:01     hiresB-2Equiv 49     hiresC-1 C*03:04     hiresC-1Equiv 10     hiresC-2 C*07:01     hiresC-2Equiv 7     hiresBw-1 Bw*4     hiresBw-2 Bw*6    HLA-DR Typing High Resolution    Collection Time: 11/11/24  2:29 AM   Result Value Ref Range    DRhiresTestM NGS     hiresDPA1-1 DPA1*01:03     hiresDPB1-1 DPB1*03:01     hiresDPB1-1N FNVX 03:01/104:01     hiresDPB1-2 DPB1*16:01     hiresDPB1-2N AWXGT 16:01/652:01     hiresDQA1-1 DQA1*01:02     hiresDQB1-1 DQB1*05:132Q     hiresDQB1-1Equiv 5     hiresDQB1-2 DQB1*06:04     hiresDQB1-2Equiv 6     hiresDRB1-1 DRB1*01:01     hiresDRB1-1Equiv 1     hiresDRB1-2 DRB1*13:02     hiresDRB1-2Equiv 13     hiresDRB3-1 DRB3*03:01     hiresDRB3-1Equiv 52    UA with Microscopic reflex to Culture    Collection Time: 11/11/24  2:30 AM    Specimen: Urine, NOS   Result Value Ref Range    Color Urine Light Yellow Colorless, Straw, Light Yellow, Yellow    Appearance Urine Clear Clear    Glucose Urine 200 (A) Negative mg/dL    Bilirubin Urine Negative Negative    Ketones Urine Negative Negative mg/dL    Specific Gravity Urine 1.018 1.003 - 1.035    Blood Urine Small (A) Negative    pH Urine 5.5 5.0 - 7.0    Protein Albumin Urine 30 (A) Negative mg/dL    Urobilinogen Urine Normal Normal, 2.0 mg/dL    Nitrite Urine Negative Negative    Leukocyte Esterase Urine Negative Negative    Mucus Urine Present (A) None Seen /LPF    RBC Urine 1 <=2 /HPF    WBC Urine 0 <=5 /HPF   ABO and Rh    Collection Time: 11/11/24  2:42 AM   Result Value Ref Range    ABO/RH(D) O POS     SPECIMEN EXPIRATION DATE [id number]    EKG 12-lead, tracing only [EKG1]    Collection Time: 11/11/24  1:38 PM   Result Value Ref Range    Systolic Blood Pressure  mmHg    Diastolic Blood Pressure  mmHg    Ventricular Rate 68 BPM    Atrial Rate 68 BPM    GA Interval 156 ms    QRS Duration 96 ms     ms    QTc 427 ms    P Axis 49  degrees    R AXIS -32 degrees    T Axis 62 degrees    Interpretation ECG       Sinus rhythm  Left axis deviation  Nonspecific ST and T wave abnormality  Abnormal ECG  No previous ECGs available  Confirmed by MD ALYSON, J CARLOS ([id number]) on 11/14/2024 8:33:41 PM     Echocardiogram Complete    Collection Time: 11/11/24  3:44 PM   Result Value Ref Range    LVEF  60-65%        I spent a total of 60 minutes on the date of the encounter doing chart review, performing a history and physical exam, completing documentation and any further activities as noted above.

## 2024-11-11 NOTE — LETTER
11/11/2024      Zane King  [address] [facility] [address]      Dear Colleague,    Thank you for referring your patient, Zane King, to the [facility] TRANSPLANT [facility]. Please see a copy of my visit note below.    TRANSPLANT NEPHROLOGY RECIPIENT EVALUATION NOTE    Assessment and Plan:  # Kidney Transplant Evaluation: Patient is a good candidate overall. Benefits of a living donor transplant were discussed.    Recommendations:  -Cardiac risk assessment  -Weight loss 14 lbs per surgeon  -Refer to Endocrinology for uncontrolled diabetes  -Refer to bleeding/clotting for DVTs  -Hep B vaccine  -Health maintenance:Colonoscopy, Dental      # CKD from presumed HTN and diabetes mellitus type 2:    Qualifying GFR of 18 from February 2023 when the patient was hospitalized for several months with a COVID 19 infection and temporary dialysis was required 4/25-5/22/21. Current GFR now ~ 24. Doing ok, not on dialysis.     # DM Type 2: diagnosed ~2005. Reports rarely checks glucose on avg 1 x/week. Does not use his CGM.  RohT3i=48.4,  currently uncontrolled on 70/30 30 Units+Lantus 30 Units.     # Cardiac Risk:    CAD, s/p CABG  2015:angioplasty and stenting in 2007,MI 2011, with angioplasty and stent. Last angio 04/20/2015, LAD, proximal 70% stenosis. Proximal circumflex 90% stenosis, OM1 branch 100% stenosis. OM2 branch 80% stenosis. Proximal RCA 90% stenosis. Right posterolateral 90% stenosis. On 04/24/2015, he underwent CABG x4 vessels. Todays ECHO 10/24: LVEF 60-65%, diastolic dysfunction grade II.   Afib: He underwent left atrial appendage closure with 27 mm Watchman 5/2022. He is off all anticoagulations .    # PAD Screening: Will obtain updated imaging for Transplant Surgery to review    # DVT provoked, 6/21 due to immobility s/p prolonged hospitalization.     # HTN: SBP 140s. Controlled on     # Obesity, Central : BMI 37.9, recommend weight goal of 265lbs. Per surgeon     #former smoker  quit in 2007, 30 pack year history     # Penile prosthesis: placed 2/2024    # MOMO: cpap     # Health Maintenance: Colonoscopy: Not up to date, Dermatology: Up to date, and Dental: Not up to date    - Discussed the risks and benefits of a transplant, including the risk of surgery and immunosuppression medications.  Patient's overall evaluation will be discussed in the Transplant Program's regular meeting with a final recommendation on the patients suitability for transplant to be made at that time.    Pending completion of the full evaluation, patient presently appears to be enough of an acceptable kidney transplant recipient candidate to have any potential kidney donors start the evaluation process.    Evaluation:  Zane King was seen in consultation at the request of Dr. Aparna Sharif for evaluation as a potential kidney transplant recipient.    Reason for Visit:  Zane King is a 64 year old male with ESKD from diabetes mellitus type 2, who presents for kidney transplant evaluation.    History of Present Illness:   PMH significant for Type 2 DM since 2005, Essential HTN, Morbid Obesity, Gout, CAD s/p CABG and Chronic kidney disease since at least 2015 where his baseline SCr 1.5-1.7 . In  2018 began uptrending to 2 with a GFR of 35 without proteinuria.He was lost to follow up. He was hospitalized in 4/ 2021 with worsening respiratory failure due to COVID. He suffered DEXTER with peak creatinine of 6.6 on 5/11/21. He was started on CRRT 4/25, but then changed to IHD 4/26.He was able to come off dialysis 5/22 and creatinine returned to his baseline of 1.8. He had DVT In 6/2021 and was started on coumadin and was on it for 6 months and after which is serum creatinine remained elevated around 2.5-2.8 with GFR of 23-25. He had kidney biopsy on 3/14/22.Warfarin nephropathy is not excluded but also cannot be confirmed as extensive red cell casts are not identified.         Kidney Disease Hx:                  Kidney Disease Dx: Diabetes mellitus type 2       Biopsy Proven: Yes; kidney Biopsy 3/14/22 : Early nodular glomerulosclerosis. Moderate to marked global glomerulosclerosis (14/33). Acute tubular injury. Marked interstitial fibrosis and tubular atrophy. Mild interstitial nephritis. Mild arterial sclerosis. Focal marked arteriolar hyalinosis.Warfarin nephropathy is not excluded but also cannot be confirmed as extensive red cell casts are not identified.        On Dialysis: No       Primary Nephrologist: Zahida       H/o Kidney Stones: No       H/o Recurrent/Frequent UTI: No         Diabetic Hx: Type 2        Diagnosis Date: 18-19 years       Medication History: pills,metformin. Humulin 70/30 30 units BID. Lantus 30unuts QHS       Diabetic Control: Poorly controlled (HbA1c >9%)   Last HbA1c: 12.4%       Hypoglycemic Unawareness: No       End-Organ Damage due to DM: Nephropathy and Peripheral neuropathy , and ED         Cardiac/Vascular Disease Risk Factors:        Cardiac Risk Factors: Diabetes, Hypertension, CKD, and Age (Male > 55, Female > 65)       Known CAD: Yes; s/p CABG       Known PAD/Caludication Symptoms: No       Known Heart Failure: No       Arrhythmia: Yes; Hx of Afib, Watchman 2022       Pulmonary Hypertension: No       Valvular Disease: No       Other: None         Viral Serology Status       CMV IgG Antibody: Positive       EBV IgG Antibody: Positive         Volume Status/Weight:        Volume status: Euvolemic       Weight:  BMI above guidelines       BMI: There is no height or weight on file to calculate BMI.         Functional Capacity/Frailty:        Enjoys walking the dog usually gets 30min at a time. Denies CP, SOB, or claudication with ambulation.    Fatigue/Decreased Energy: [] No [x] Yes    Chest Pain or SOB with Exertion: [x] No [] Yes    Significant Weight Change: [x] No [] Yes    Nausea, Vomiting or Diarrhea: [x] No [] Yes    Fever, Sweats or Chills:  [x] No [] Yes    Leg Swelling  [x] No [] Yes        History of Cancer:   Melanoma: ear    Other Significant Medical Issues:    #history of COVID-19 pneumonia 4/2021 - was intubated for 21 days     Allergy Testing Questions:   Medication that caused a reaction None   Antibiotics used that didn't give an allergic reaction?  Patient doesn't know    COVID Vaccination Up To Date: No    Potential Living Kidney Donors: Not sure    Review of Systems:  A comprehensive review of systems was obtained and negative, except as noted in the HPI or PMH.    Past Medical History:   Medical record was reviewed and PMH was discussed with patient and noted below.  Past Medical History:   Diagnosis Date     Diabetes (H)      Hypertension        Past Social History:   Past Surgical History:   Procedure Laterality Date     IR CVC TUNNEL W2 CATH W/O PORT  5/10/2021     IR LUMBAR PUNCTURE  5/7/2021     Personal history of bleeding or anesthesia problems: No    Family History:  No family history on file.    Personal History:   Social History     Socioeconomic History     Marital status:      Spouse name: Not on file     Number of children: Not on file     Years of education: Not on file     Highest education level: Not on file   Occupational History     Not on file   Tobacco Use     Smoking status: Former     Types: Cigarettes     Smokeless tobacco: Never     Tobacco comments:     Quit smoking 18-20 years ago    Substance and Sexual Activity     Alcohol use: Yes     Comment: Rarely, 1 drink evry 2-3wks     Drug use: Never     Sexual activity: Not on file   Other Topics Concern     Parent/sibling w/ CABG, MI or angioplasty before 65F 55M? No   Social History Narrative     Not on file     Social Drivers of Health     Financial Resource Strain: Low Risk  (10/2/2024)    Received from [facility]    Financial Resource Strain      Difficulty of Paying Living Expenses: 3      Difficulty of Paying Living Expenses: Not on file   Food  Insecurity: No Food Insecurity (10/2/2024)    Received from [facility]    Food Insecurity      Do you worry your food will run out before you are able to buy more?: 1   Transportation Needs: No Transportation Needs (10/2/2024)    Received from [facility]    Transportation Needs      Does lack of transportation keep you from medical appointments?: 1      Does lack of transportation keep you from work, meetings or getting things that you need?: 1   Physical Activity: Not on file   Stress: Not on file   Social Connections: Socially Integrated (10/2/2024)    Received from [facility]    Social Connections      Do you often feel lonely or isolated from those around you?: 0   Interpersonal Safety: Unknown (2/6/2024)    Received from [facility]    Humiliation, Afraid, Rape, and Kick questionnaire      Fear of Current or Ex-Partner: Not on file      Emotionally Abused: Not on file      Physically Abused: No      Sexually Abused: No   Housing Stability: Low Risk  (10/2/2024)    Received from [facility]    Housing Stability      What is your housing situation today?: 1       Allergies:  No Known Allergies    Medications:  Current Outpatient Medications   Medication Sig Dispense Refill     amLODIPine (NORVASC) 10 MG tablet Take 1 tablet by mouth daily.       amLODIPine-atorvastatin (CADUET) 10-10 MG tablet        aspirin 81 MG EC tablet Take 81 mg by mouth.       atorvastatin (LIPITOR) 40 MG tablet Take 1 tablet by mouth at bedtime.       calcitRIOL (ROCALTROL) 0.25 MCG capsule Take 0.25 mcg by mouth.       chlorthalidone (HYGROTON) 25 MG tablet Take 25 mg by mouth.       Dextromethorphan-guaiFENesin  MG/5ML syrup Take 10 mLs by mouth.       gabapentin (NEURONTIN) 100 MG capsule Take 1 capsule by mouth at bedtime.       insulin NPH-Regular 70/30 (HUMULIN 70/30;NOVOLIN 70/30) (70-30)  100 UNIT/ML vial Inject 30 Units subcutaneously.       metoprolol succinate ER (TOPROL XL) 100 MG 24 hr tablet Take 100 mg by mouth.       Testosterone 1.62 % GEL APPLY 1 PUMP TRANSDERMALLY ONCE DAILY       Vitamin D3 50 mcg (2000 units) tablet Take 2,000 Units by mouth.       No current facility-administered medications for this visit.       Vitals:     /76 (Abnormal)     Pulse 75   SpO2 95 %   Weight 126.9 kg (279 lb 11.2 oz)   Height 1.829 m (6')   BMI (Calculated) 37.93     Exam:  GENERAL APPEARANCE: alert and no distress  HENT: mouth without ulcers or lesions  RESP: lungs clear to auscultation - no rales, rhonchi or wheezes  CV: regular rhythm, normal rate, no rub, no murmur  EDEMA: no LE edema bilaterally  ABDOMEN: soft, nondistended, nontender, bowel sounds normal  MS: extremities normal - no gross deformities noted, no evidence of inflammation in joints, no muscle tenderness  SKIN: no rash    Results:   Recent Results (from the past 2 weeks)   Prostate spec antigen screen [YMN7087]    Collection Time: 11/11/24  2:29 AM   Result Value Ref Range    Prostate Specific Antigen Screen 1.04 0.00 - 4.50 ng/mL   Hemoglobin A1c [LAB90]    Collection Time: 11/11/24  2:29 AM   Result Value Ref Range    Estimated Average Glucose 309 (H) <117 mg/dL    Hemoglobin A1C 12.4 (H) <5.7 %   C-peptide [WYX026]    Collection Time: 11/11/24  2:29 AM   Result Value Ref Range    C Peptide 2.9 0.9 - 6.9 ng/mL    Patient Fasting > 8hrs? Unknown    Varicella Zoster Virus Antibody IgG [[id number]]    Collection Time: 11/11/24  2:29 AM   Result Value Ref Range    Varicella Zoster Virus Antibody IgG Interpretation Positive     Varicella Zoster Virus Antibody IgG Instrument Value 26.90 <1.00 S/CO   Treponema Abs w Reflex to RPR and Titer [[id number]]    Collection Time: 11/11/24  2:29 AM   Result Value Ref Range    Treponema Antibody Total Nonreactive Nonreactive   HIV Antigen Antibody Combo Pretransplant Cascade    Collection Time:  11/11/24  2:29 AM   Result Value Ref Range    HIV Antigen Antibody Combo Pretransplant Nonreactive Nonreactive   Hepatitis C antibody [[id number]]    Collection Time: 11/11/24  2:29 AM   Result Value Ref Range    Hepatitis C Antibody Nonreactive Nonreactive   Hepatitis B surface antigen [[id number]]    Collection Time: 11/11/24  2:29 AM   Result Value Ref Range    Hepatitis B Surface Antigen Nonreactive Nonreactive   Hepatitis B Surface Antibody [[id number]]    Collection Time: 11/11/24  2:29 AM   Result Value Ref Range    Hepatitis B Surface Antibody Nonreactive     Hepatitis B Surface Antibody Instrument Value <3.50 <8.5 m[IU]/mL   Hepatitis B core antibody [[id number]]    Collection Time: 11/11/24  2:29 AM   Result Value Ref Range    Hepatitis B Core Antibody Total Nonreactive Nonreactive   EBV Capsid Antibody IgG [[id number]]    Collection Time: 11/11/24  2:29 AM   Result Value Ref Range    EBV Capsid Alysa IgG Instrument Value >750.0 (H) <18.0 U/mL    EBV Capsid Antibody IgG Positive (A) No detectable antibody.   CMV Antibody IgG [[id number]]    Collection Time: 11/11/24  2:29 AM   Result Value Ref Range    CMV Alysa IgG Instrument Value 3.70 (H) <0.60 U/mL    CMV Antibody IgG Positive, suggests recent or past exposure. (A) No detectable antibody.    Thrombin time [DOO345]    Collection Time: 11/11/24  2:29 AM   Result Value Ref Range    Thrombin Time 18.1 13.0 - 19.0 Seconds   Partial thromboplastin time [LAB56]    Collection Time: 11/11/24  2:29 AM   Result Value Ref Range    aPTT 32 22 - 38 Seconds   Lupus Anticoagulant Panel [RSM2421]    Collection Time: 11/11/24  2:29 AM   Result Value Ref Range    PTT Ratio 1.14 <1.30    DRVVT Screen Ratio 0.93 <1.08    Lupus Result Negative Negative    Lupus Interpretation       The INR is normal.  APTT ratio is normal.    DRVVT Screen ratio is normal.  Thrombin time is normal.  NEGATIVE TEST; A LUPUS ANTICOAGULANT WAS NOT DETECTED IN THIS SPECIMEN WITHIN THE LIMITS OF THE TESTING  REPERTOIRE.  If the clinical picture is strongly suggestive of an antiphospholipid syndrome, recommend anticardiolipin and beta-2-glycoprotein (IgG and IgM) antibody tests.    Naye Humphreys MD, PhD  [facility]     INR [[id number]]    Collection Time: 11/11/24  2:29 AM   Result Value Ref Range    INR 1.04 0.85 - 1.15   Cardiolipin Alysa IgG and IgM [LAB 6836]    Collection Time: 11/11/24  2:29 AM   Result Value Ref Range    Cardiolipin Alysa IgG Instrument Value 7.2 <10.0 GPL-U/mL    Cardiolipin Antibody IgG Negative Negative    Cardiolipin Alysa IgM Instrument Value <2.0 <10.0 MPL-U/mL    Cardiolipin Antibody IgM Negative Negative   Comprehensive metabolic panel [LAB17]    Collection Time: 11/11/24  2:29 AM   Result Value Ref Range    Sodium 141 135 - 145 mmol/L    Potassium 3.4 3.4 - 5.3 mmol/L    Carbon Dioxide (CO2) 21 (L) 22 - 29 mmol/L    Anion Gap 16 (H) 7 - 15 mmol/L    Urea Nitrogen 45.7 (H) 8.0 - 23.0 mg/dL    Creatinine 2.85 (H) 0.67 - 1.17 mg/dL    GFR Estimate 24 (L) >60 mL/min/1.73m2    Calcium 9.4 8.8 - 10.4 mg/dL    Chloride 104 98 - 107 mmol/L    Glucose 291 (H) 70 - 99 mg/dL    Alkaline Phosphatase 128 40 - 150 U/L    AST 20 0 - 45 U/L    ALT 19 0 - 70 U/L    Protein Total 7.3 6.4 - 8.3 g/dL    Albumin 4.3 3.5 - 5.2 g/dL    Bilirubin Total 0.6 <=1.2 mg/dL   Antibody titer red cell [[id number]]    Collection Time: 11/11/24  2:29 AM   Result Value Ref Range    Anti-A1 IgG Titer 64     Anti-A1 IgM Titer 8     Anti-B IgG Titer 128     Anti-B IgM Titer 16     SPECIMEN EXPIRATION DATE [id number]     ANTIBODY TITER IGM SCREEN Negative    Factor 2 and 5 mutation analysis    Collection Time: 11/11/24  2:29 AM   Result Value Ref Range    METHODOLOGY       The regions of genomic DNA containing the F5 gene mutation R506Q(1691G>A) and the Factor 2 (Prothrombin O56083V) gene mutation were simultaneously amplified using the polymerase chain reaction. The amplified products were digested with restriction  endonuclease TaqI and products were analyzed by gel electrophoresis.        RESULTS         Factor V 1691G>A ([place])  RESULTS:  Mutation analyzed: 1691G>A  Factor V 1691G>A ([place])  Interpretation:  ABSENT  Factor V 1691G>A ([place]) mutation  genotype:  G/G    FACTOR 2/PROTHROMBIN RESULTS:  Mutation analyzed: 10320J>A  Factor 2 Mutation Interpretation:  ABSENT  Factor 2 Mutation genotype:  G/G        INTERPRETATION       The patient is negative for the Factor V 1691G>A ([place]) and negative for the Factor 2 mutation.  (Electronically signed by: Ventura Guaman MD November 15, 2024 2:24 PM)      COMMENTS       If a patient is the recipient of an allogeneic bone marrow transplant, this test must be done on a pre-transplant sample or buccal swab.  A previous allogeneic bone marrow transplant will interfere with test results.  Call the [facility] ([phone]) for instructions on sample collection for these patients.      DISCLAIMER       This test was developed and its performance characteristics determined by [facility]. It has not been cleared or approved by the FDA. The laboratory is regulated under CLIA as qualified to perform high-complexity testing. This test is used for clinical purposes. It should not be regarded as investigational or for research.    A resident/fellow in an accredited training program was involved in the selection of testing, review of laboratory data, and/or interpretation of this case.  I, as the senior physician, attest that I: (i) confirmed appropriate testing, (ii) examined the relevant raw data for the specimen(s); and (iii) rendered or confirmed the interpretation(s).        FACTOR 2 INTERPRETATION         Factor 2 Mutation Interpretation:  ABSENT      FACTOR V INTERPRETATION       Factor V 1691G>A ([place])  Interpretation:  ABSENT      Signout Location if Remote Report signed out at:   P[facility]     Specimen Description       Blood:  ACD     Quantiferon TB Gold Plus Grey Tube    Collection Time: 11/11/24  2:29 AM    Specimen: Peripheral Blood   Result Value Ref Range    Quantiferon Nil Tube 0.02 IU/mL   Quantiferon TB Gold Plus Green Tube    Collection Time: 11/11/24  2:29 AM    Specimen: Peripheral Blood   Result Value Ref Range    Quantiferon TB1 Tube 0.04 IU/mL   Quantiferon TB Gold Plus Yellow Tube    Collection Time: 11/11/24  2:29 AM    Specimen: Peripheral Blood   Result Value Ref Range    Quantiferon TB2 Tube 0.02    Quantiferon TB Gold Plus Purple Tube    Collection Time: 11/11/24  2:29 AM    Specimen: Peripheral Blood   Result Value Ref Range    Quantiferon Mitogen 10.00 IU/mL   CBC with platelets and differential    Collection Time: 11/11/24  2:29 AM   Result Value Ref Range    WBC Count 9.4 4.0 - 11.0 10e3/uL    RBC Count 5.19 4.40 - 5.90 10e6/uL    Hemoglobin 15.4 13.3 - 17.7 g/dL    Hematocrit 44.7 40.0 - 53.0 %    MCV 86 78 - 100 fL    MCH 29.7 26.5 - 33.0 pg    MCHC 34.5 31.5 - 36.5 g/dL    RDW 14.1 10.0 - 15.0 %    Platelet Count 155 150 - 450 10e3/uL    % Neutrophils 71 %    % Lymphocytes 20 %    % Monocytes 6 %    % Eosinophils 2 %    % Basophils 1 %    % Immature Granulocytes 1 %    NRBCs per 100 WBC 0 <1 /100    Absolute Neutrophils 6.7 1.6 - 8.3 10e3/uL    Absolute Lymphocytes 1.9 0.8 - 5.3 10e3/uL    Absolute Monocytes 0.6 0.0 - 1.3 10e3/uL    Absolute Eosinophils 0.1 0.0 - 0.7 10e3/uL    Absolute Basophils 0.1 0.0 - 0.2 10e3/uL    Absolute Immature Granulocytes 0.1 <=0.4 10e3/uL    Absolute NRBCs 0.0 10e3/uL   Adult Type and Screen    Collection Time: 11/11/24  2:29 AM   Result Value Ref Range    ABO/RH(D) O POS     Antibody Screen Negative Negative    SPECIMEN EXPIRATION DATE [id number]    Quantiferon TB Gold Plus    Collection Time: 11/11/24  2:29 AM    Specimen: Peripheral Blood   Result Value Ref Range    Quantiferon-TB Gold Plus Negative Negative    TB1 Ag minus Nil Value 0.02 IU/mL    TB2 Ag minus Nil Value 0.00  IU/mL    Mitogen minus Nil Result 9.98 IU/mL    Nil Result 0.02 IU/mL   HLA-ABC Typing High Resolution    Collection Time: 11/11/24  2:29 AM   Result Value Ref Range    ABTEST METHOD NGS     hiresA-1 A*02:01     hiresA-1Equiv 2     hiresA-2 A*24:02     hiresA-2Equiv 24     hiresB-1 B*40:01     hiresB-1Equiv 60     hiresB-2 B*49:01     hiresB-2Equiv 49     hiresC-1 C*03:04     hiresC-1Equiv 10     hiresC-2 C*07:01     hiresC-2Equiv 7     hiresBw-1 Bw*4     hiresBw-2 Bw*6    HLA-DR Typing High Resolution    Collection Time: 11/11/24  2:29 AM   Result Value Ref Range    DRhiresTestM NGS     hiresDPA1-1 DPA1*01:03     hiresDPB1-1 DPB1*03:01     hiresDPB1-1N FNVX 03:01/104:01     hiresDPB1-2 DPB1*16:01     hiresDPB1-2N AWXGT 16:01/652:01     hiresDQA1-1 DQA1*01:02     hiresDQB1-1 DQB1*05:132Q     hiresDQB1-1Equiv 5     hiresDQB1-2 DQB1*06:04     hiresDQB1-2Equiv 6     hiresDRB1-1 DRB1*01:01     hiresDRB1-1Equiv 1     hiresDRB1-2 DRB1*13:02     hiresDRB1-2Equiv 13     hiresDRB3-1 DRB3*03:01     hiresDRB3-1Equiv 52    UA with Microscopic reflex to Culture    Collection Time: 11/11/24  2:30 AM    Specimen: Urine, NOS   Result Value Ref Range    Color Urine Light Yellow Colorless, Straw, Light Yellow, Yellow    Appearance Urine Clear Clear    Glucose Urine 200 (A) Negative mg/dL    Bilirubin Urine Negative Negative    Ketones Urine Negative Negative mg/dL    Specific Gravity Urine 1.018 1.003 - 1.035    Blood Urine Small (A) Negative    pH Urine 5.5 5.0 - 7.0    Protein Albumin Urine 30 (A) Negative mg/dL    Urobilinogen Urine Normal Normal, 2.0 mg/dL    Nitrite Urine Negative Negative    Leukocyte Esterase Urine Negative Negative    Mucus Urine Present (A) None Seen /LPF    RBC Urine 1 <=2 /HPF    WBC Urine 0 <=5 /HPF   ABO and Rh    Collection Time: 11/11/24  2:42 AM   Result Value Ref Range    ABO/RH(D) O POS     SPECIMEN EXPIRATION DATE [id number]    EKG 12-lead, tracing only [EKG1]    Collection Time: 11/11/24  1:38  PM   Result Value Ref Range    Systolic Blood Pressure  mmHg    Diastolic Blood Pressure  mmHg    Ventricular Rate 68 BPM    Atrial Rate 68 BPM    WA Interval 156 ms    QRS Duration 96 ms     ms    QTc 427 ms    P Axis 49 degrees    R AXIS -32 degrees    T Axis 62 degrees    Interpretation ECG       Sinus rhythm  Left axis deviation  Nonspecific ST and T wave abnormality  Abnormal ECG  No previous ECGs available  Confirmed by MD ALYSON, J CARLOS ([id number]) on 11/14/2024 8:33:41 PM     Echocardiogram Complete    Collection Time: 11/11/24  3:44 PM   Result Value Ref Range    LVEF  60-65%        I spent a total of 60 minutes on the date of the encounter doing chart review, performing a history and physical exam, completing documentation and any further activities as noted above.        Again, thank you for allowing me to participate in the care of your patient.        Sincerely,        Lisa Hui NP

## 2024-11-11 NOTE — PROGRESS NOTES
Transplant Surgery Consult Note    Medical record number: [id number]  YOB: 1960,   Consult requested by Dr. Teague for evaluation of kidney transplant candidacy.    Assessment and Recommendations: Mr. King is a good candidate for transplantation and has a good understanding of the risks and benefits of this approach to management of renal failure and diabetes. The following issues should be addressed prior to transplant:     Mr. King has Chronic renal failure due to diabetes mellitus type 2 whose condition is not expected to resolve, is expected to progress, and is expected to continue to develop related comorbid conditions.  Cardiology consult for cardiac risk stratification to be ordered: Yes  CT abdomen and pelvis without contrast to be ordered for assessment of vascular targets: Yes  Transplant listing labs ordered to include HLA, ABOx2, Cr, etc.  Dietician consult ordered: Yes  Social work consult ordered: Yes  Imaging reports reviewed: No recent imaging available for review  Radiology images reviewed: No recent imaging available for review  Recipient suitable to move forward with work up of living donors:  Yes  Needs to improve HgbA1c to <8.5%  Derm recommendations on waiting time. Recently had melanoma excised on R ear  Updated colonoscopy  Lose weight down to 265 and reassess  Weight management clinic for assistance with weight loss.  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  Was on anticoagulation for that and atrial fibrillation however has had Watchman procedure and now off anticoagulation.  Question of anticoagulation associated nephropathy however there were no red casts on the biopsy and therefore this was in question.  He does note an improvement in renal function with cessation of warfarin.  Will likely need chemical DVT prophylaxis at the time of surgery given his history of provoked  DVTs.  Patient states he was worked up for hypercoagulable disorder however I cannot find the notes from this.  Would appreciate reviewing hematology workup for hypercoagulability.  If he has not had this he should see our hematologist.      The majority of our visit was spent in counselling, discussing the medical and surgical risks of living or  donor kidney and pancreas transplantation, either in a simultaneous or sequential fashion. I contrasted approximate wait time for SPK vs living vs  donor kidneys from normal (0-85%) or higher (%) kidney donor profile index (KDPI) donors and their associated outcomes. I would not recommend this individual to consider kidneys from high KDPI donors. The reason for this decision is best summarized as: not on dialysis yet.  Access to transplant will be impacted by living donor availability and overall candidacy for SPK, as well as the influence of blood type and degree of sensitization. We discussed advantages of preemptive transplant as well as living donor kidney transplant, and graft and patient survival outcomes associated with these options. Potential surgical complications of kidney and pancreas transplantation include bleeding, clotting, infection, wound complications, anastomotic failure and other issues such as cardiac complications, pneumonia, deep venous thrombosis, pulmonary embolism, post transplant diabetes and death. We discussed the need for protocol biopsy of the kidney and the possible need for a ureteral stent (and subsequent removal). We discussed benefits and risks associated with different approaches to exocrine drainage of pancreatic secretions. We also discussed differences in the average length of stay, recovery process, and posttransplant lab and monitoring protocol. We discussed the risk of graft rejection and recurrent diabetic nephropathy in the setting of poor glycemic control. I emphasized the need for strict  "immunosuppression adherence and the potential for complications of immunosuppression such as skin cancer or lymphoma, as well as a very low but not zero risk of donor-derived disease transmission risks (infection, cancer). Mr. King asked good questions and the patient's candidacy will be reviewed at our Multidisciplinary Selection Committee. Thank you for the opportunity to participate in Mr. King's care.    Total time: 60 minutes        Aparna Sharif MD FACS  Associate Professor of Surgery  Director, Living Kidney Donor Program.  ---------------------------------------------------------------------------------------------------    HPI: Mr. King has Chronic renal failure due to diabetes mellitus type 2. The patient has had diabetes for 19 years. Management is by Lantus 30 units at bedtime  Novolog Mix 70-30 30 units twice daily . The patient usually checks his blood sugar 1 times every 2 weeks.  Daily blood glucoses range typically from 80 to \"I don't know\".  Hypoglyemic unawareness is not an issue.  The diabetes is uncontrolled.    Complications of diabetes include:    Retinopathy:  No  Neuropathy: No  Gastroparesis:  No    The patient is not on dialysis.    Has potential kidney donors:  Yes .  Interested in participation in paired exchange if a donor is willing: Yes     The patient has the following pertinent history:       No    Yes  Dialysis:    [x]      [] via:       Blood Transfusion                  [x]      []  Number of units:   Most recently:  Pregnancy:    [x]      [] Number:       Previous Transplant:  [x]      [] Details:    Cancer    []      [x] Comment: melanoma R ear  Kidney stones   [x]      [] Comment:      Recurrent infections  [x]      []  Type:                  Bladder dysfunction  [x]      [] Cause:    Claudication   [x]      [] Distance:    Previous Amputation  [x]      [] Cause:     Chronic anticoagulation  [x]      [] Indication:  Church  [x]      []     Past " Medical History:   Diagnosis Date    Diabetes (H)     Hypertension      Past Surgical History:   Procedure Laterality Date    IR CVC TUNNEL W2 CATH W/O PORT  5/10/2021    IR LUMBAR PUNCTURE  5/7/2021   CABG x 4 2015  Umbilical hernia repair with mesh  Penile implant  Coronary stenting    Family History   Problem Relation Age of Onset    Coronary Artery Disease Father     Diabetes Type 2  Maternal Grandmother      Social History     Socioeconomic History    Marital status:      Spouse name: Not on file    Number of children: Not on file    Years of education: Not on file    Highest education level: Not on file   Occupational History    Not on file   Tobacco Use    Smoking status: Former     Types: Cigarettes    Smokeless tobacco: Never    Tobacco comments:     Quit smoking 18-20 years ago    Substance and Sexual Activity    Alcohol use: Yes     Comment: Rarely, 1 drink evry 2-3wks    Drug use: Never    Sexual activity: Not on file   Other Topics Concern    Parent/sibling w/ CABG, MI or angioplasty before 65F 55M? No   Social History Narrative    Not on file     Social Drivers of Health     Financial Resource Strain: Low Risk  (10/2/2024)    [facility]    Financial Resource Strain     Difficulty of Paying Living Expenses: 3     Difficulty of Paying Living Expenses: Not on file   Food Insecurity: No Food Insecurity (10/2/2024)    Received from [facility]    Food Insecurity     Do you worry your food will run out before you are able to buy more?: 1   Transportation Needs: No Transportation Needs (10/2/2024)    Received from [facility]    Transportation Needs     Does lack of transportation keep you from medical appointments?: 1     Does lack of transportation keep you from work, meetings or getting things that you need?: 1   Physical Activity: Not on file   Stress: Not on file   Social Connections:  Socially Integrated (10/2/2024)    Received from [facility]    Social Connections     Do you often feel lonely or isolated from those around you?: 0   Interpersonal Safety: Unknown (2/6/2024)    Received from [facility]    Humiliation, Afraid, Rape, and Kick questionnaire     Fear of Current or Ex-Partner: Not on file     Emotionally Abused: Not on file     Physically Abused: No     Sexually Abused: No   Housing Stability: Low Risk  (10/2/2024)    Received from [facility]    Housing Stability     What is your housing situation today?: 1       ROS:   CONSTITUTIONAL:  No fevers or chills  EYES: negative for icterus  ENT:  negative for hearing loss, tinnitus and sore throat  RESPIRATORY:  negative for cough, sputum, dyspnea  CARDIOVASCULAR:  negative for chest pain Fatigue  GASTROINTESTINAL:  negative for nausea, vomiting, diarrhea or constipation  GENITOURINARY:  negative for incontinence, dysuria, bladder emptying problems  HEME:  No easy bruising  INTEGUMENT:  negative for rash and pruritus  NEURO:  Negative for headache, seizure disorder    Allergies:   No Known Allergies    Medications:  Prescription Medications as of 11/11/2024         Rx Number Disp Refills Start End Last Dispensed Date Next Fill Date Owning Pharmacy    amLODIPine (NORVASC) 10 MG tablet  -- -- 6/12/2024 --       Sig: Take 1 tablet by mouth daily.    Class: Historical    Route: Oral    amLODIPine-atorvastatin (CADUET) 10-10 MG tablet  -- --  --       Class: Historical    aspirin 81 MG EC tablet  -- --  --       Sig: Take 81 mg by mouth.    Class: Historical    Route: Oral    atorvastatin (LIPITOR) 40 MG tablet  -- -- 8/2/2023 --       Sig: Take 1 tablet by mouth at bedtime.    Class: Historical    Route: Oral    calcitRIOL (ROCALTROL) 0.25 MCG capsule  -- -- 8/7/2024 8/7/2025       Sig: Take 0.25 mcg by mouth.    Class: Historical    Route: Oral    chlorthalidone (HYGROTON)  "25 MG tablet  -- -- 4/24/2024 --       Sig: Take 25 mg by mouth.    Class: Historical    Route: Oral    Dextromethorphan-guaiFENesin  MG/5ML syrup  -- -- 10/3/2024 --       Sig: Take 10 mLs by mouth.    Class: Historical    Route: Oral    gabapentin (NEURONTIN) 100 MG capsule  -- -- 6/12/2024 --       Sig: Take 1 capsule by mouth at bedtime.    Class: Historical    Route: Oral    insulin glargine (LANTUS VIAL) 100 UNIT/ML vial  -- --  --       Sig: Inject 30 Units subcutaneously at bedtime.    Class: Historical    Route: Subcutaneous    insulin NPH-Regular 70/30 (HUMULIN 70/30;NOVOLIN 70/30) (70-30) 100 UNIT/ML vial  -- -- 2/2/2024 2/1/2025       Sig: Inject 30 Units subcutaneously.    Class: Historical    Route: Subcutaneous    metoprolol succinate ER (TOPROL XL) 100 MG 24 hr tablet  -- -- 7/1/2024 --       Sig: Take 100 mg by mouth.    Class: Historical    Route: Oral    Testosterone 1.62 % GEL  -- -- 11/16/2023 --       Sig: APPLY 1 PUMP TRANSDERMALLY ONCE DAILY    Class: Historical    Vitamin D3 50 mcg (2000 units) tablet  -- -- 1/29/2024 11/4/2025       Sig: Take 2,000 Units by mouth.    Class: Historical    Route: Oral            Exam:   Pulse:  [75] 75  BP: (164)/(76) 164/76  SpO2:  [95 %] 95 %  Appearance: in no apparent distress.   Skin: normal  Head and Neck: Normal, no rashes or jaundice  Respiratory: easy respirations, no audible wheezing.  Cardiovascular: RRR  Abdomen: rounded, obese, and protuberant, No distention, Surgical scars consistent with history, and anterior superior iliac spine barely palpable  Extremities: femoral difficult to palpate bilaterally and bilaterally secondary to body habitus/, Edema, none  Neuro: without deficit     Diagnostics:   No results found for this or any previous visit (from the past 4 weeks).  No results found for: \"CPRA\"   "

## 2024-11-11 NOTE — PATIENT INSTRUCTIONS
"WEIGHT MAINTENANCE STRATEGIES    According to the National Weight Control Registry there are several things that people who have lost weight and kept it off have in common. Some of them are...    1. 3 MEALS A DAY: Make sure you are eating 3 meals each day. No skipping meals. 80% of people who skip meals are overweight or obese. Missing meals slows the metabolism, making it harder to maintain a healthy weight.    2. FOOD DIARY: Although calorie counting may not be ideal, keep a food and exercise diary to help bring awareness to how often you eat/snack, portion sizes, liquid intake, etc. Consider  reverse measuring  foods you eat. For example, plate up a typical meal and put certain food items back into a measuring cup. If you think you only eat 1 cup of rice at a meal, you may be eating more than you realize, even 2-3 cups. Portion size can be deceiving. Consider paying close attention to the portion size of coffee creamers, honey, nuts/nut butters, salad dressings, condiments, etc.      4. HIGH FIBER/LOW FAT: Lean sources of protein (skim milk, skinless, baked or broiled chicken breast, fish, etc.) will help you meet your protein needs while fruits, vegetables, and whole grains will help you get the fiber that your body needs. This is heart healthy eating and helps to keep calorie levels in balance.  **Some lean proteins: chicken, turkey, tuna, salmon, crab, fish, shrimp, scallops, lobster, lean cuts of beef and pork, luncheon meats, veggie burgers, beans (black, lima, garbanzo, mock, kidney, refried), chile, cottage cheese, string cheese, other cheese, eggs, tofu, peanut butter, nuts, vegan crumbles, greek yogurt    5. 8,000 STEPS PER DAY: This is a \"weight maintenance dose. \" It is essential to get your steps in every day, 7 days a week. You don't have to \"work out \" 7 days a week, but throughout the day, getting 8000 steps will help you maintain the weight you have lost. Parking far away, taking the stairs " "instead of the elevator, and pacing while on the phone are some ways to help achieve this goal.    6. Eat at home 90% OF THE TIME: People who maintain a healthy weight eat at home, or meal prepared at home, 90% of the time. Studies show that people consume an average of 770 mark when eating out at a restaurant and 440 mark when eating a meal prepared at home. This equates to almost 35 pounds of excess weight for a person who eats out once a day for 1 year.    OTHER HELPFUL HABITS    -Minimize liquid calories. Stick to water, flavored water/carbonated water, black coffee, and unsweetened tea. Soda, juice, and sports drinks are not healthy choices.   -Avoiding \"mindless \"eating, i.e., eating at the TV, in the car, in front of the computer, etc.  -Protect your sleep and Manage your stress. It is more than just  the food . Poor sleep and high stress levels impact us greatly, including weight maintenance.   -Limit restaurant, cafeteria, take out, and/or drive thru to 2 times/week or less.     OPTIMIZING YOUR METABOLISM FOR LIFE    1. MUSCLE MAINTENANCE: Muscle burns calories up to 70% better than fat does. As we age our body composition changes and we lose muscle mass. Weight training can help us keep and even build muscle mass. Dumbbells, pushups, resistance band training, and weight machines are all examples of ways to keep and/or build muscle mass.    2. MOVE: 8000 steps daily has been shown to be a weight maintenance dose. Aim for 10,000 steps each day. Helpful habits include taking the stairs instead of the elevator when possible, parking at the far end of the parking lot, pacing while on the phone, and taking the dog for a walk. Of course using a treadmill, stair climber, elliptical , and bicycle are all ways of getting 10,000 steps.    3. 3 MEALS EACH DAY: Make sure to get your 3 meals each day. Skipping breakfast, working through your lunch, or not eating dinner will lead to a slowing of your metabolism. " Studies show that 80% of people who skip meals are overweight or obese.    4. ADEQUATE PROTEIN INTAKE: Getting adequate protein is beneficial for a number of reasons: to aid the healing process, to blunt cravings immediately after eating and for a period of time after eating, to help keep blood sugars level, and to help you maintain your muscle mass. See #1 above.

## 2024-11-12 LAB
C PEPTIDE SERPL-MCNC: 2.9 NG/ML (ref 0.9–6.9)
CARDIOLIPIN IGG SER IA-ACNC: 7.2 GPL-U/ML
CARDIOLIPIN IGG SER IA-ACNC: NEGATIVE
CARDIOLIPIN IGM SER IA-ACNC: <2 MPL-U/ML
CARDIOLIPIN IGM SER IA-ACNC: NEGATIVE
CMV IGG SERPL IA-ACNC: 3.7 U/ML
CMV IGG SERPL IA-ACNC: ABNORMAL
DRVVT SCREEN RATIO: 0.93
EBV VCA IGG SER IA-ACNC: >750 U/ML
EBV VCA IGG SER IA-ACNC: POSITIVE
FASTING STATUS PATIENT QL REPORTED: NORMAL
GAMMA INTERFERON BACKGROUND BLD IA-ACNC: 0.02 IU/ML
LA PPP-IMP: NEGATIVE
LUPUS INTERPRETATION: NORMAL
M TB IFN-G BLD-IMP: NEGATIVE
M TB IFN-G CD4+ BCKGRND COR BLD-ACNC: 9.98 IU/ML
MITOGEN IGNF BCKGRD COR BLD-ACNC: 0 IU/ML
MITOGEN IGNF BCKGRD COR BLD-ACNC: 0.02 IU/ML
PTT RATIO: 1.14
QUANTIFERON MITOGEN: 10 IU/ML
QUANTIFERON NIL TUBE: 0.02 IU/ML
QUANTIFERON TB1 TUBE: 0.04 IU/ML
QUANTIFERON TB2 TUBE: 0.02
THROMBIN TIME: 18.1 SECONDS (ref 13–19)
VZV IGG SER QL IA: 26.9 S/CO
VZV IGG SER QL IA: POSITIVE

## 2024-11-12 NOTE — PROGRESS NOTES
"Psychosocial Assessment For Kidney Transplantation  Patient Name/ Age: Zane \"Weston\" Leo King 64 year old   Medical Record #: [id number]  Duration of Interview:    30min  Process:   Face-to-Face Interview                (counseling < 50%)   Present at Appointment: Weston (Patient) & Lori (Wife).         : LITA Richard Date:  November 12, 2024        Type of transplant: Kidney    Donor type:      Cadaver and spouse (Lori King).    Prior Transplants:    No Status of Transplant: N/A           Current Living Situation    Location:   [address] [place] [address]  With Whom:  Living in a home in [place], [place] with his wife, Lori, his daughter, Arnulfo (30 yrs old), son, Emile (18 yrs old), and their dog ().        Family/ Social Support:    Supports:  Available, Helpful - Identified his wife (Lori), son (Emile), and daughter (Arnulfo), as his primary supports post-transplant. Reported that he also has another daughter, Angelica, who lives in [place], [place] with her  that would be available for support as well.     Shared openness to staying in a local hotel post-discharge, or with a family member if needed. However, expressed preference for staying in a hotel.      Committed Relationship:     , Stable/Supportive.    Other Supports:    Available, Occasional - Bill (Brother, living in [place], [place]), or Moira (Sister, living in [place], [place]). Reported that his mom is an emotional support (living in [place], [place]).        Activities/ Functional Ability    Current Level: Ambulatory and independent with ADL's.      Transportation Drive Self. Reported that his wife or children would assist with transportation post-discharge.        Vocational/Employment/Financial     Employment   Retired.    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    Income   SS intermediate, Savings, and Spouse's Income (Wife works as an ).    Insurance      At this time, patient can " "afford medication costs:  Yes . [employer] Medicare.        Medical Status    Current Mode of Treatment for ESRD None   Complications Type 2 Diabetic. Neuropathy in feet.        Behavioral    Tobacco Use: No. Quit smoking about 18 yrs ago.  Chemical Dependency: No. Reported having about 1 drink every 2 weeks or less. No previous treatment history or concerns around use.          Psychiatric Impairment: No. Denied SI today or within past two weeks. Denied prior hospitalizations for MH concerns, self-injurious behaviors, and previous suicide attempts. Endorsed some history of anxiety/PTSD from previous medical trauma after being hospitalized for an extended period of time for complications after COVID-19. Shared that overall he has been coping/managing this well. Discussed therapy resources and encouraged patient to reach out with support as needed.     Reading Ability: Good.  Education Level: Some College. Recent Legal History: No.      Coping Style/Strategies: \"Staying busy, walking the dog, spending time with friends/family.\"        Ability to Adhere to Complex Medical Regime: Yes .    Adherence History: Patient feels confident in their ability to manage their appointments and follow up with the doctor when needed. They report no concerns in maintaining this moving forward.         Education  _X_ Medicare  _X_ Rehabilitation  _X_ Donor issues  _X_ Community resources  _X_ Post discharge housing  _X_ Financial resources  _X_ Medical insurance options  _X_ Psych adjustment  _X_ Family adjustment  _X_ Health Care Directive - Provided education and a blank handout to complete.    Psychosocial Risks of Transplant Reviewed and Discussed:  _X_ Increased stress related to emotional,            family, social, employment or financial           situation  _X_ Effect on work and/or disability benefits  _X_ Effect on future health and life           insurance  _X_ Transplant outcome expectations may           not be met  _X_ Mental " Health Risks: anxiety,           depression, PTSD, guilt, grief and           chronic fatigue     Notable Items:   None noted.       Final Evaluation/Assessment   Patient seemed to process information well. Appeared well informed, motivated and able to follow post transplant requirements. Behavior was appropriate during interview. Has adequate income and insurance coverage. Adequate social support. No major contraindications noted for transplant.  At this time patient appears to understand the risks and benefits of transplant.      Recommendation  Acceptable    Selection Criteria Met:  Plan for support Yes   Chemical Dependence Yes   Smoking Yes   Mental Health Yes   Adequate Finances Yes    Signature: LITA Richard [facility]   Title: Clinical

## 2024-11-14 LAB
ATRIAL RATE - MUSE: 68 BPM
DIASTOLIC BLOOD PRESSURE - MUSE: NORMAL MMHG
INTERPRETATION ECG - MUSE: NORMAL
P AXIS - MUSE: 49 DEGREES
PR INTERVAL - MUSE: 156 MS
QRS DURATION - MUSE: 96 MS
QT - MUSE: 402 MS
QTC - MUSE: 427 MS
R AXIS - MUSE: -32 DEGREES
SYSTOLIC BLOOD PRESSURE - MUSE: NORMAL MMHG
T AXIS - MUSE: 62 DEGREES
VENTRICULAR RATE- MUSE: 68 BPM

## 2024-11-15 LAB
A*: NORMAL
A*LOCUS SEROLOGIC EQUIVALENT: 2
A*LOCUS: NORMAL
A*SEROLOGIC EQUIVALENT: 24
ABTEST METHOD: NORMAL
B*: NORMAL
B*LOCUS SEROLOGIC EQUIVALENT: 60
B*LOCUS: NORMAL
B*SEROLOGIC EQUIVALENT: 49
BW-1: NORMAL
BW-2: NORMAL
C*: NORMAL
C*LOCUS SEROLOGIC EQUIVALENT: 10
C*LOCUS: NORMAL
C*SEROLOGIC EQUIVALENT: 7
DPA1*: NORMAL
DPB1*: NORMAL
DPB1*LOCUS NMDP: NORMAL
DPB1*LOCUS: NORMAL
DPB1*NMDP: NORMAL
DQA1*LOCUS: NORMAL
DQB1*: NORMAL
DQB1*LOCUS SEROLOGIC EQUIVALENT: 5
DQB1*LOCUS: NORMAL
DQB1*SEROLOGIC EQUIVALENT: 6
DRB1*: NORMAL
DRB1*LOCUS SEROLOGIC EQUIVALENT: 1
DRB1*LOCUS: NORMAL
DRB1*SEROLOGIC EQUIVALENT: 13
DRB3*LOCUS SEROLOGIC EQUIVALENT: 52
DRB3*LOCUS: NORMAL
DRSSO TEST METHOD: NORMAL

## 2024-11-20 ENCOUNTER — COMMITTEE REVIEW (OUTPATIENT)
Dept: TRANSPLANT | Facility: CLINIC | Age: 64
End: 2024-11-20
Payer: COMMERCIAL

## 2024-11-20 NOTE — COMMITTEE REVIEW
Kidney/Pancreas Committee Review Note     Evaluation Date: 11/11/2024  Committee Review Date: 11/20/2024    Organ being evaluated for: Kidney    Transplant Phase: Evaluation  Transplant Status: Active    Transplant Coordinator: Michelle Colmenares  Transplant Surgeon:        Referring Physician: Yolis Teague    Primary Diagnosis:   Secondary Diagnosis:     Committee Review Members:  Nephrology Lisa Hui, NP, Jacy Pearce MD, Alessandro Ferguson, APRN CNP, Alejandro Baker MD, Michael Mas MD   Nutrition Johanne Leonardo, ALEXEY   Pharmacist Niecy Holcomb, [facility]    - Clinical Leticia Sargent, MSW, Arnulfo Byrne, MSW   Transplant STANTON MOSQUERA, RN, Ema Jones, RN, Denisa Tejeda, NARCISO, ANAYA Braga CNP, Mai Wagner, RN, Desiree Medina, NARCISO, Marychuy Montes, RN, Michelle Jackson, RN, Diana Bradford, RN, Asha Gandhi, RN   Transplant Surgery Roberto Sunshine MD       Transplant Eligibility: Irreversible chronic kidney disease treated w/dialysis or expected need for dialysis    Committee Review Decision: Needs Re-presentation    Relative Contraindications: Other    Absolute Contraindications: None     Committee Chair Roberto Sunshine MD verbally attested to the committee's decision.    Committee Discussion Details: Reviewed pt's medical status and evaluation results to date with multidisciplinary committee.    Recommended the following evaluation items:    Cardiology: Needs Risk assessment with angiogram   Endocrine: Needs to follow up with endocrinology to gain better control of his sugars. His A1C needs to be less than 8.5%  Hematology: Needs to see for past DVTs. May need to be chemically anticoagulated after transplant.  Dermatology: Recent excision of melanoma on the right ear - derm to discuss cancer free wait time  BMI: (Goal Weight 265) and then needs reassessment by surgeon  Imaging Recommended:  CT A/P noncon and Iliacs   Health Maintenance:  Colonoscopy and dental   KDPI  and Receipt of Information:completed  [facility]plant [facility] Class Review:  Needs to complete    Patient should have live donors register now to initiate donor evaluation: Yes    Committee determined that patient is a Good candidate for Kidney     Listing plan: Will not list at this time as patient is on dialysis    A2B Candidate: No    Patient will be called and summary letter will be sent.

## 2024-11-21 ENCOUNTER — TELEPHONE (OUTPATIENT)
Dept: TRANSPLANT | Facility: CLINIC | Age: 64
End: 2024-11-21
Payer: COMMERCIAL

## 2024-11-21 DIAGNOSIS — N18.4 TYPE 2 DIABETES MELLITUS WITH STAGE 4 CHRONIC KIDNEY DISEASE, WITH LONG-TERM CURRENT USE OF INSULIN (H): ICD-10-CM

## 2024-11-21 DIAGNOSIS — I10 ESSENTIAL HYPERTENSION: ICD-10-CM

## 2024-11-21 DIAGNOSIS — Z12.5 ENCOUNTER FOR SCREENING FOR MALIGNANT NEOPLASM OF PROSTATE: ICD-10-CM

## 2024-11-21 DIAGNOSIS — E66.01 CLASS 2 SEVERE OBESITY DUE TO EXCESS CALORIES WITH SERIOUS COMORBIDITY AND BODY MASS INDEX (BMI) OF 37.0 TO 37.9 IN ADULT (H): ICD-10-CM

## 2024-11-21 DIAGNOSIS — I25.810 CORONARY ARTERY DISEASE INVOLVING CORONARY BYPASS GRAFT OF NATIVE HEART WITHOUT ANGINA PECTORIS: ICD-10-CM

## 2024-11-21 DIAGNOSIS — Z76.82 AWAITING ORGAN TRANSPLANT: ICD-10-CM

## 2024-11-21 DIAGNOSIS — E11.9 DIABETES MELLITUS, TYPE 2 (H): ICD-10-CM

## 2024-11-21 DIAGNOSIS — E66.812 CLASS 2 SEVERE OBESITY DUE TO EXCESS CALORIES WITH SERIOUS COMORBIDITY AND BODY MASS INDEX (BMI) OF 37.0 TO 37.9 IN ADULT (H): ICD-10-CM

## 2024-11-21 DIAGNOSIS — I25.10 CARDIOVASCULAR DISEASE: ICD-10-CM

## 2024-11-21 DIAGNOSIS — Z01.818 PRE-TRANSPLANT EVALUATION FOR KIDNEY TRANSPLANT: ICD-10-CM

## 2024-11-21 DIAGNOSIS — Z76.82 ORGAN TRANSPLANT CANDIDATE: Primary | ICD-10-CM

## 2024-11-21 DIAGNOSIS — E78.5 HYPERLIPIDEMIA: ICD-10-CM

## 2024-11-21 DIAGNOSIS — Z87.891 HISTORY OF TOBACCO USE: ICD-10-CM

## 2024-11-21 DIAGNOSIS — N18.4 CHRONIC KIDNEY DISEASE, STAGE 4, SEVERELY DECREASED GFR (H): ICD-10-CM

## 2024-11-21 DIAGNOSIS — Z79.4 TYPE 2 DIABETES MELLITUS WITH STAGE 4 CHRONIC KIDNEY DISEASE, WITH LONG-TERM CURRENT USE OF INSULIN (H): ICD-10-CM

## 2024-11-21 DIAGNOSIS — E11.22 TYPE 2 DIABETES MELLITUS WITH STAGE 4 CHRONIC KIDNEY DISEASE, WITH LONG-TERM CURRENT USE OF INSULIN (H): ICD-10-CM

## 2024-11-21 NOTE — TELEPHONE ENCOUNTER
Called patient at this time to discuss the results of the Selection Committee from yesterday. Let him know he has been approved as a candidate to continue his evaluation for kidney transplant. He needs to lose weight to a goal of 265 lbs and then be reassess by a transplant surgeon.  He needs iliacs, CT A/p, cardiology risk assessment with angiogram, dermatology to discuss any waiting time given his recent melanoma excision on his ear, colonoscopy, endocrine for management of blood sugars as his A1C is high. He needs MTP education, Hep B vaccinations. He also needs hematology for history of DVTs,

## 2024-11-25 NOTE — PROGRESS NOTES
[facility]   Cardiology Service  History and Physical      Zane King MRN# [id number]   YOB: 1960 Age: 64 year old       HPI:   Zane King is a 64 year old year old male with a medical history significant for CKD 2/2 presumed HTN and DM2 (not on dialysis), CAD s/p CABG (2015), DVT, obesity, and former smoker. He presents today for cardiovascular risk assessment as part of pre-kidney transplant evaluation.     He is a former smoker; quit 18-20 years ago. He does use alcohol very rarely, does not drugs. His activity level is sedentary. He does the house work and walks the dog occasionally. He walks the dog around a park in the neighborhood maybe 6 blocks.He does have a treadmill and weights at home; admits to not using these. Does sound visibly winded during exam but could be exacerbated by mask. Admits to having a cold he is getting. He denies symptoms of chest pain, dizziness, lightheadedness, palpitations, shortness of breath, orthopnea, PND, or edema. He does have family history of pre-mature heart disease.       Cardiovascular Risk Factor Profile:  coronary artery disease, hypertension , obesity, diabetes, male age >45, smoker/previous smoker, sedentary lifestyle, and high cholesterol (LDL)    Current Cardiovascular Symptoms:  - None    ACC HF Stage:  Stage B    NYHA Class:  Class II    Functional Capacity:  Performs 4 METS exercise without symptoms (e.g., light housework, stairs, 4 mph walk, 7 mph bike, slow step dance)        Past Medical History:   Diagnosis Date    Diabetes (H)     Hypertension        Past Surgical History:   Procedure Laterality Date    IR CVC TUNNEL W2 CATH W/O PORT  5/10/2021    IR LUMBAR PUNCTURE  5/7/2021       Current Outpatient Medications   Medication Sig Dispense Refill    amLODIPine (NORVASC) 10 MG tablet Take 1 tablet by mouth daily.      amLODIPine-atorvastatin (CADUET) 10-10 MG tablet       aspirin 81 MG EC  tablet Take 81 mg by mouth.      atorvastatin (LIPITOR) 40 MG tablet Take 1 tablet by mouth at bedtime.      calcitRIOL (ROCALTROL) 0.25 MCG capsule Take 0.25 mcg by mouth.      chlorthalidone (HYGROTON) 25 MG tablet Take 25 mg by mouth.      Dextromethorphan-guaiFENesin  MG/5ML syrup Take 10 mLs by mouth.      gabapentin (NEURONTIN) 100 MG capsule Take 1 capsule by mouth at bedtime.      insulin glargine (LANTUS VIAL) 100 UNIT/ML vial Inject 30 Units subcutaneously at bedtime.      insulin NPH-Regular 70/30 (HUMULIN 70/30;NOVOLIN 70/30) (70-30) 100 UNIT/ML vial Inject 30 Units subcutaneously.      metoprolol succinate ER (TOPROL XL) 100 MG 24 hr tablet Take 100 mg by mouth.      Testosterone 1.62 % GEL APPLY 1 PUMP TRANSDERMALLY ONCE DAILY      Vitamin D3 50 mcg (2000 units) tablet Take 2,000 Units by mouth.       No current facility-administered medications for this visit.       Family History   Problem Relation Age of Onset    Coronary Artery Disease Father     Diabetes Type 2  Maternal Grandmother        Social History     Tobacco Use    Smoking status: Former     Types: Cigarettes    Smokeless tobacco: Never    Tobacco comments:     Quit smoking 18-20 years ago    Substance Use Topics    Alcohol use: Yes     Comment: Rarely, 1 drink evry 2-3wks       No Known Allergies      Review Of Systems:   CONSTITUTIONAL: No report of fevers or chills  RESPIRATORY: No cough, wheezing, SOB, or hemoptysis  CARDIOVASCULAR: see HPI  MUSCULO-SKELETAL: No joint pain or swelling, no muscle pain  NEURO: No paresthesias, syncope, pre-syncope, lightheadness, dizziness or vertigo  ENDOCRINE: No temperature intolerance, no skin/hair changes  PSYCHIATRIC: No change in mood, feeling down/anxious, no change in sleep or appetite  GI: No melena or hematochezia, no change in bowel habits  : No hematuria or dysuria, no hesitancy, dribbling or incontinence. Still making urine  HEME: No easy bruising or bleeding, no history of anemia,  no history of blood clots  SKIN: No rashes or sores, no unusual itching      Physical Examination:  Vitals: /71   Pulse 77   Wt 125.9 kg (277 lb 8 oz)   SpO2 96%   BMI 37.64 kg/m    BMI= Body mass index is 37.64 kg/m .    GENERAL APPEARANCE: healthy, alert and no distress  HEENT: no icterus, no xanthelasmas, normal pupil size and reaction, normal palate, mucosa moist  NECK: JVP difficult to assess due to body habitus , brisk carotid upstroke bilaterally  CHEST: lungs clear to auscultation without rales, rhonchi or wheezes, no use of accessory muscles, no retractions  CARDIOVASCULAR: regular rhythm, normal S1 and S2, no S3 or S4 and no murmur, click or rub.  ABDOMEN: soft, non tender, without hepatosplenomegaly, no masses palpable, bowel sounds normal  EXTREMITIES: warm, trace LE edema, DP/PT pulses 2+ bilaterally, no clubbing or cyanosis   NEURO: alert and oriented to person/place/time, normal speech, gait and affect  SKIN: no ecchymoses, no rashes      Laboratory:  Last Comprehensive Metabolic Panel:  Lab Results   Component Value Date     2024    POTASSIUM 3.4 2024    CHLORIDE 104 2024    CO2 21 (L) 2024    ANIONGAP 16 (H) 2024     (H) 2024    BUN 45.7 (H) 2024    CR 2.85 (H) 2024    GFRESTIMATED 24 (L) 2024    RYAN 9.4 2024       Last CBC:  CBC RESULTS:   Recent Labs   Lab Test 24  0229   WBC 9.4   RBC 5.19   HGB 15.4   HCT 44.7   MCV 86   MCH 29.7   MCHC 34.5   RDW 14.1          24 EK/11/24 Echocardiogram:  Global and regional left ventricular function is normal with an EF of 60-65%.  Severe concentric wall thickening consistent with left ventricular hypertrophy  is present.  Right ventricular function, chamber size, wall motion, and thickness are  normal.  Pulmonary artery systolic pressure cannot be assessed.  The inferior vena cava is normal.  No pericardial effusion is present.  There is no prior  study for direct comparison      Assessment and Plan:     # Coronary Artery Disease   # s/p CABG (2015)  # Hx of MI (2011) w/ Prior PCI  # Hyperlipidemia   Known history of CAD with CABG x 4 in 2015. Has not had ischemic evaluation since. Most recent lipid panel on 3/22/23 with total 131, LDL 70, . He is on statin therapy. He denies chest pain, dizziness, lightheadedness, orthopnea, edema, or PND. Has some shortness of breath right now as he is getting over a cold.  Risk factors for CAD include hypertension , obesity, diabetes, male age >45, previous smoker, sedentary lifestyle, and high cholesterol (LDL).   - Continue Aspirin 81mg daily   - Continue Lipitor 40mg q HS   - Obtain updated lipid panel   - Ischemic evaluation with coronary angiogram once closer to transplant or on dialysis   - Follow-up with me 2 weeks after procedure; telephone, virtual, or in person OK     # Atrial Fibrillation  # s/p Watchman (5/2022)  History of AF with Watchman placement no longer required to be on AC. Is in NSR rhythm today.   - Continue to monitor     # Hypertension  Blood pressure in office today 134/71. Blood pressures on home average 130-140's/70's.   - Continue Chlorthalidone 25mg daily   - Continue Toprol XL 100mg daily   - Monitor BP's at home. Keep a log of readings and bring to follow up appointments  - BP goal < 130/80    # Diabetes Mellitus Type 2  Most recent A1c per chart review 9.2% on 10/2/24. Home regimen includes Lantus 30 units at HS, Humulin 30 units daily. Does not currently have equipment to check blood sugars but recently got a continuous BG monitoring system he will start using. He denies hyper/hypoglycemic unawareness.   - Educated on importance of diabetic control in preventing progression of heart and kidney disease  - Outpatient follow up with endocrinologist as previously recommended;  recently started seeing one    # Obesity (BMI 37.93)   Per patient statement weight has been down trending over  the past year Lost about 10 lb over the last year.  - Educated patient on the importance of healthy diet and exercise in reducing risk for heart disease   - Encouraged 150 minutes/week of moderate intensity exercise   - Encouraged healthy weight loss; discussed Mediterranean diet     # ESRD not yet on Dialysis  Was briefly on dialysis when he had COVID. He is not currently on dialysis. He admits to being compliant with all medications. Denies uremic symptoms; pruritus, nausea, vomiting.  - Additional labs / follow-up / testing per SOT team      CAD Risk Level:  High Risk: > 3 risk factors, OR diabetic kidney disease, OR DM > 10 years, known CAD or PAD, prior PCI or CABG, on dialysis 5 years or more           RCRI Score:   - High risk surgery (>5% cardiac complication risk) = 1 point   - Coronary Artery Disease (MI, positive stress test, angina, Qs on EKG) = 1 point   - Diabetes Mellitus (on Insulin) = 1 point   - Serum Creatinine >2.0 mg/dl = 1 point        I have reviewed and updated the patient's Past Medical History, Social History, Family History and Medication List.       ANAYA Martinez, CNP  [facility]      Review of prior external note(s) from - care everywhere, SOT, nephrology  Review of the result(s) of each unique test - echocardiogram, EKG, CT  Ordering of each unique test  Prescription drug management- medication reconciliation     30 minutes spent by me on the date of the encounter doing chart review, review of outside records, review of test results, patient visit, and documentation       ANAYA Jackson CNP on 12/18/2024 at 9:49 AM

## 2024-11-27 ENCOUNTER — COMMITTEE REVIEW (OUTPATIENT)
Dept: TRANSPLANT | Facility: CLINIC | Age: 64
End: 2024-11-27
Payer: COMMERCIAL

## 2024-12-03 LAB
PROTOCOL CUTOFF: NORMAL
SA 1  COMMENTS: NORMAL
SA 1 CELL: NORMAL
SA 1 TEST METHOD: NORMAL
SA 2 CELL: NORMAL
SA 2 COMMENTS: NORMAL
SA 2 TEST METHOD: NORMAL
SA1 HI RISK ABY: NORMAL
SA1 MOD RISK ABY: NORMAL
SA2 HI RISK ABY: NORMAL
SA2 MOD RISK ABY: NORMAL
UNACCEPTABLE ANTIGENS: NORMAL
UNOS CPRA: 7

## 2024-12-05 ENCOUNTER — TELEPHONE (OUTPATIENT)
Dept: TRANSPLANT | Facility: CLINIC | Age: 64
End: 2024-12-05
Payer: COMMERCIAL

## 2024-12-05 NOTE — COMMITTEE REVIEW
Kidney/Pancreas Committee Review Note     Evaluation Date: 11/11/2024  Committee Review Date: 11/27/2024    Organ being evaluated for: Kidney    Transplant Phase: Evaluation  Transplant Status: Active    Transplant Coordinator: Michelle Colmenares  Transplant Surgeon:        Referring Physician: Yolis Teague    Primary Diagnosis:   Secondary Diagnosis:     Committee Review Members:  Nephrology Lisa Hui, NP, Alessandro Ferguson, APRN CNP, Michael Mas MD   Nutrition Johanne Leonardo, ALEXEY   Pharmacist Niecy Holcomb, [facility]    - Clinical Leticia Sargent, [facility], Arnulfo Byrne [facility]   Transplant STANTON MOSQUERA, RN, Ema Jones, NARCISO, Denisa Tejeda, NARCISO, Mai Wagner, RN, Desiree Medina, RN, Marychuy Montes, NARCISO, Michelle Jackson, RN, Diana Bradford, NARCISO, Asha Gandhi, RN   Transplant Surgery Aparna Sharif MD, MD       Transplant Eligibility: Irreversible chronic kidney disease treated w/dialysis or expected need for dialysis    Committee Review Decision: Approved    Relative Contraindications: None    Absolute Contraindications: None     Committee Chair Aparna Sharif MD verbally attested to the committee's decision.    Committee Discussion Details: Discussed that patient is not on dialysis and does have a qualifying GFR.  Request to list patient inactive on the kidney alone transplant list to begin waiting time approved by committee. Patient will be called and letter will be sent.

## 2024-12-05 NOTE — TELEPHONE ENCOUNTER
Called patient at this time to discuss the results of the Selection Committee from last week. He is approved to be listing inactive but will need to complete the virtual education class prior to being able to list him inactive. He expressed good understanding and is scheduled for next Thursday.  At that time of the education class, he will be eligible for inactive listing and hand off to wait list team.

## 2024-12-12 ENCOUNTER — VIRTUAL VISIT (OUTPATIENT)
Dept: TRANSPLANT | Facility: CLINIC | Age: 64
End: 2024-12-12
Payer: COMMERCIAL

## 2024-12-12 DIAGNOSIS — Z76.82 ORGAN TRANSPLANT CANDIDATE: Primary | ICD-10-CM

## 2024-12-12 NOTE — GROUP NOTE
Date: 12/12/2024    Scheduled Start Time:  9:25 AM   Scheduled End Time: 10:19 AM    Department: [facility]    Facilitator(s): Dimitry Ayala RN    Documentation:  Solid Organ Transplant Education    Patient attended the pre-transplant patient education class today, 12/12/2024. The [facility] website pre-transplant modules were viewed:     Content reviewed:  Living Donation and how to access that program  Paired exchange  Kidney Donor Profile Index (KDPI)  Waiting list issues (right to decline without penalty, high PHS risk donors, what to expect when called with an offer)  Hepatitis C Donor Acceptance education and risks  Hospital experience, length of stay, need to stay locally post-discharge (2-4 weeks)    Surgical complications with video                      Post-surgery lifting and driving restrictions  Post-transplant routines, frequency of lab work and clinic visits  Role of Transplant Coordinator    Participants were informed of the benefits of transplant as well as potential risks such as infection, cancer, and death. The need for total adherence with immunosuppression medications and following transplant regimens was stressed.  The overall evaluation/approval/listing process was reviewed.  Patient attended alone and was an active participant in class.            Patient:   Zane King    Transplant Episode(s):  Kidney Transplant Evaluation - 11/11/2024

## 2024-12-18 ENCOUNTER — OFFICE VISIT (OUTPATIENT)
Dept: TRANSPLANT | Facility: CLINIC | Age: 64
End: 2024-12-18
Attending: NURSE PRACTITIONER
Payer: COMMERCIAL

## 2024-12-18 ENCOUNTER — ANCILLARY PROCEDURE (OUTPATIENT)
Dept: ULTRASOUND IMAGING | Facility: CLINIC | Age: 64
End: 2024-12-18
Attending: NURSE PRACTITIONER
Payer: COMMERCIAL

## 2024-12-18 ENCOUNTER — ANCILLARY PROCEDURE (OUTPATIENT)
Dept: CT IMAGING | Facility: CLINIC | Age: 64
End: 2024-12-18
Attending: NURSE PRACTITIONER
Payer: COMMERCIAL

## 2024-12-18 ENCOUNTER — LAB (OUTPATIENT)
Dept: LAB | Facility: CLINIC | Age: 64
End: 2024-12-18
Payer: COMMERCIAL

## 2024-12-18 VITALS
DIASTOLIC BLOOD PRESSURE: 71 MMHG | OXYGEN SATURATION: 96 % | HEART RATE: 77 BPM | BODY MASS INDEX: 37.64 KG/M2 | SYSTOLIC BLOOD PRESSURE: 134 MMHG | WEIGHT: 277.5 LBS

## 2024-12-18 DIAGNOSIS — I25.10 CARDIOVASCULAR DISEASE: ICD-10-CM

## 2024-12-18 DIAGNOSIS — Z76.82 ORGAN TRANSPLANT CANDIDATE: ICD-10-CM

## 2024-12-18 DIAGNOSIS — I25.810 CORONARY ARTERY DISEASE INVOLVING CORONARY BYPASS GRAFT OF NATIVE HEART WITHOUT ANGINA PECTORIS: ICD-10-CM

## 2024-12-18 DIAGNOSIS — Z76.82 AWAITING ORGAN TRANSPLANT: ICD-10-CM

## 2024-12-18 DIAGNOSIS — I10 ESSENTIAL HYPERTENSION: ICD-10-CM

## 2024-12-18 DIAGNOSIS — E11.9 DIABETES MELLITUS, TYPE 2 (H): ICD-10-CM

## 2024-12-18 DIAGNOSIS — E78.2 MIXED HYPERLIPIDEMIA: ICD-10-CM

## 2024-12-18 DIAGNOSIS — E66.01 CLASS 2 SEVERE OBESITY DUE TO EXCESS CALORIES WITH SERIOUS COMORBIDITY AND BODY MASS INDEX (BMI) OF 37.0 TO 37.9 IN ADULT (H): ICD-10-CM

## 2024-12-18 DIAGNOSIS — Z01.818 PRE-TRANSPLANT EVALUATION FOR KIDNEY TRANSPLANT: ICD-10-CM

## 2024-12-18 DIAGNOSIS — Z87.891 HISTORY OF TOBACCO USE: ICD-10-CM

## 2024-12-18 DIAGNOSIS — N18.4 CHRONIC KIDNEY DISEASE, STAGE 4, SEVERELY DECREASED GFR (H): ICD-10-CM

## 2024-12-18 DIAGNOSIS — E78.5 HYPERLIPIDEMIA: ICD-10-CM

## 2024-12-18 DIAGNOSIS — Z12.5 ENCOUNTER FOR SCREENING FOR MALIGNANT NEOPLASM OF PROSTATE: ICD-10-CM

## 2024-12-18 DIAGNOSIS — Z79.4 TYPE 2 DIABETES MELLITUS WITH STAGE 4 CHRONIC KIDNEY DISEASE, WITH LONG-TERM CURRENT USE OF INSULIN (H): ICD-10-CM

## 2024-12-18 DIAGNOSIS — N18.4 TYPE 2 DIABETES MELLITUS WITH STAGE 4 CHRONIC KIDNEY DISEASE, WITH LONG-TERM CURRENT USE OF INSULIN (H): ICD-10-CM

## 2024-12-18 DIAGNOSIS — E11.22 TYPE 2 DIABETES MELLITUS WITH STAGE 4 CHRONIC KIDNEY DISEASE, WITH LONG-TERM CURRENT USE OF INSULIN (H): ICD-10-CM

## 2024-12-18 DIAGNOSIS — I10 ESSENTIAL HYPERTENSION: Primary | ICD-10-CM

## 2024-12-18 DIAGNOSIS — E66.812 CLASS 2 SEVERE OBESITY DUE TO EXCESS CALORIES WITH SERIOUS COMORBIDITY AND BODY MASS INDEX (BMI) OF 37.0 TO 37.9 IN ADULT (H): ICD-10-CM

## 2024-12-18 LAB
CHOLEST SERPL-MCNC: 125 MG/DL
FASTING STATUS PATIENT QL REPORTED: YES
HDLC SERPL-MCNC: 28 MG/DL
LDLC SERPL CALC-MCNC: 63 MG/DL
NONHDLC SERPL-MCNC: 97 MG/DL
TRIGL SERPL-MCNC: 168 MG/DL

## 2024-12-18 PROCEDURE — 36415 COLL VENOUS BLD VENIPUNCTURE: CPT

## 2024-12-18 PROCEDURE — G0463 HOSPITAL OUTPT CLINIC VISIT: HCPCS

## 2024-12-18 PROCEDURE — 74176 CT ABD & PELVIS W/O CONTRAST: CPT | Performed by: STUDENT IN AN ORGANIZED HEALTH CARE EDUCATION/TRAINING PROGRAM

## 2024-12-18 PROCEDURE — 80061 LIPID PANEL: CPT

## 2024-12-18 PROCEDURE — 93978 VASCULAR STUDY: CPT | Performed by: STUDENT IN AN ORGANIZED HEALTH CARE EDUCATION/TRAINING PROGRAM

## 2024-12-18 NOTE — PATIENT INSTRUCTIONS
You were seen in the cardiology clinic by: Vivian Goodman CNP    Plan:     Medication Changes:   - None    Labs/Tests Needed:   - You need a coronary angiogram to assess your bypass grafts for blockages. We should wait until you are on dialysis or closer to transplant.  - Please get your lipids (cholesterol) checked. You can make a LAB ONLY visit at any [facility] clinic. Please make sure you are fasting (no food or drink) at least 8 hours prior to this lab draw.     Follow Up Visit:   - Follow-up with me 2 weeks after coronary angiogram. Virtual, telephone, or in-person OK for this visit.     General Recommendations/Guidelines:  - Monitor your blood pressures at home. Please keep a log of the readings and bring to your future nephrology or cardiology appointments. Your blood pressure goal is < 130/80. It is important to achieve adequate blood pressure control before transplant.   - Focus on a low sodium diet. You should aim to consume <2,000mg of sodium daily.   - I encourage you to achieve 150 minutes/week of moderate intensity exercise if able. If this is not achievable right away, you can gradually work yourself up to this starting with low intensity exercises (i.e. walking or swimming) a couple days a week. No swimming if on peritoneal dialysis.**      Important Numbers:     Cardiology   Telephone Number     To schedule an appointment or leave a message for your care team:   [phone]      Press #1   To reach the billing department: [phone]      Press # 3     To obtain copies of your medical records: [phone]      Press # 4   To reach the on-call cardiologist for after hours or on weekends: [phone]     Option #4, and ask to speak to the [facility]     Cardiovascular Clinic:   [address] [place] [address]. [place] [address] [phone]      Thank you for trusting us with your health care needs!

## 2024-12-18 NOTE — LETTER
Problem: Non-Pressure Injury Wound  Goal: # No deterioration in wound  7/12/2023 1854 by Eleanor Desai RN  Outcome: Outcome Met, Continue evaluating goal progress toward completion  7/12/2023 1854 by Eleanor Desai RN  Outcome: Outcome Met, Continue evaluating goal progress toward completion  Goal: # Verbalizes understanding of wound and wound care  Description: If abnormality is a skin tear, avoid using tape on skin including transparent dressings. Document education using the patient education activity.  7/12/2023 1854 by Eleanor Desai RN  Outcome: Outcome Met, Continue evaluating goal progress toward completion  7/12/2023 1854 by Eleanor Desai RN  Outcome: Outcome Met, Continue evaluating goal progress toward completion  Goal: Participates in wound care activities  7/12/2023 1854 by Eleanor Desai RN  Outcome: Outcome Met, Continue evaluating goal progress toward completion  7/12/2023 1854 by Eleanor Desai RN  Outcome: Outcome Met, Continue evaluating goal progress toward completion  Goal: Wound care provided to promote comfort needs (Hospice)  7/12/2023 1854 by Eleanor Desai RN  Outcome: Outcome Met, Continue evaluating goal progress toward completion  7/12/2023 1854 by Eleanor Desai RN  Outcome: Outcome Met, Continue evaluating goal progress toward completion     Problem: At Risk for Falls  Goal: # Patient does not fall  7/12/2023 1854 by Eleanor Desai RN  Outcome: Outcome Met, Continue evaluating goal progress toward completion  7/12/2023 1854 by Eleanor Desai RN  Outcome: Outcome Met, Continue evaluating goal progress toward completion  Goal: # Takes action to control fall-related risks  7/12/2023 1854 by Eleanor Desai RN  Outcome: Outcome Met, Continue evaluating goal progress toward completion  7/12/2023 1854 by Eleanor Desai RN  Outcome: Outcome Met, Continue evaluating goal progress toward completion  Goal: # Verbalizes understanding  12/18/2024      Zane King  [address] [facility] [address]      Dear Colleague,    Thank you for referring your patient, Zane King, to the [facility]. Please see a copy of my visit note below.          [facility]   Cardiology Service  History and Physical      Zane King MRN# [id number]   YOB: 1960 Age: 64 year old       HPI:   Zane King is a 64 year old year old male with a medical history significant for CKD 2/2 presumed HTN and DM2 (not on dialysis), CAD s/p CABG (2015), DVT, obesity, and former smoker. He presents today for cardiovascular risk assessment as part of pre-kidney transplant evaluation.     He is a former smoker; quit 18-20 years ago. He does use alcohol very rarely, does not drugs. His activity level is sedentary. He does the house work and walks the dog occasionally. He walks the dog around a park in the neighborhood maybe 6 blocks.He does have a treadmill and weights at home; admits to not using these. Does sound visibly winded during exam but could be exacerbated by mask. Admits to having a cold he is getting. He denies symptoms of chest pain, dizziness, lightheadedness, palpitations, shortness of breath, orthopnea, PND, or edema. He does have family history of pre-mature heart disease.       Cardiovascular Risk Factor Profile:  coronary artery disease, hypertension , obesity, diabetes, male age >45, smoker/previous smoker, sedentary lifestyle, and high cholesterol (LDL)    Current Cardiovascular Symptoms:  - None    ACC HF Stage:  Stage B    NYHA Class:  Class II    Functional Capacity:  Performs 4 METS exercise without symptoms (e.g., light housework, stairs, 4 mph walk, 7 mph bike, slow step dance)        Past Medical History:   Diagnosis Date     Diabetes (H)      Hypertension        Past Surgical History:   Procedure Laterality Date     IR CVC TUNNEL W2 CATH W/O PORT   5/10/2021     IR LUMBAR PUNCTURE  5/7/2021       Current Outpatient Medications   Medication Sig Dispense Refill     amLODIPine (NORVASC) 10 MG tablet Take 1 tablet by mouth daily.       amLODIPine-atorvastatin (CADUET) 10-10 MG tablet        aspirin 81 MG EC tablet Take 81 mg by mouth.       atorvastatin (LIPITOR) 40 MG tablet Take 1 tablet by mouth at bedtime.       calcitRIOL (ROCALTROL) 0.25 MCG capsule Take 0.25 mcg by mouth.       chlorthalidone (HYGROTON) 25 MG tablet Take 25 mg by mouth.       Dextromethorphan-guaiFENesin  MG/5ML syrup Take 10 mLs by mouth.       gabapentin (NEURONTIN) 100 MG capsule Take 1 capsule by mouth at bedtime.       insulin glargine (LANTUS VIAL) 100 UNIT/ML vial Inject 30 Units subcutaneously at bedtime.       insulin NPH-Regular 70/30 (HUMULIN 70/30;NOVOLIN 70/30) (70-30) 100 UNIT/ML vial Inject 30 Units subcutaneously.       metoprolol succinate ER (TOPROL XL) 100 MG 24 hr tablet Take 100 mg by mouth.       Testosterone 1.62 % GEL APPLY 1 PUMP TRANSDERMALLY ONCE DAILY       Vitamin D3 50 mcg (2000 units) tablet Take 2,000 Units by mouth.       No current facility-administered medications for this visit.       Family History   Problem Relation Age of Onset     Coronary Artery Disease Father      Diabetes Type 2  Maternal Grandmother        Social History     Tobacco Use     Smoking status: Former     Types: Cigarettes     Smokeless tobacco: Never     Tobacco comments:     Quit smoking 18-20 years ago    Substance Use Topics     Alcohol use: Yes     Comment: Rarely, 1 drink evry 2-3wks       No Known Allergies      Review Of Systems:   CONSTITUTIONAL: No report of fevers or chills  RESPIRATORY: No cough, wheezing, SOB, or hemoptysis  CARDIOVASCULAR: see HPI  MUSCULO-SKELETAL: No joint pain or swelling, no muscle pain  NEURO: No paresthesias, syncope, pre-syncope, lightheadness, dizziness or vertigo  ENDOCRINE: No temperature intolerance, no skin/hair changes  PSYCHIATRIC: No  of fall risk/precautions  Description: Document education using the patient education activity  7/12/2023 1854 by Eleanor Desai, RN  Outcome: Outcome Met, Continue evaluating goal progress toward completion  7/12/2023 1854 by Eleanor Desai, RN  Outcome: Outcome Met, Continue evaluating goal progress toward completion      change in mood, feeling down/anxious, no change in sleep or appetite  GI: No melena or hematochezia, no change in bowel habits  : No hematuria or dysuria, no hesitancy, dribbling or incontinence. Still making urine  HEME: No easy bruising or bleeding, no history of anemia, no history of blood clots  SKIN: No rashes or sores, no unusual itching      Physical Examination:  Vitals: /71   Pulse 77   Wt 125.9 kg (277 lb 8 oz)   SpO2 96%   BMI 37.64 kg/m    BMI= Body mass index is 37.64 kg/m .    GENERAL APPEARANCE: healthy, alert and no distress  HEENT: no icterus, no xanthelasmas, normal pupil size and reaction, normal palate, mucosa moist  NECK: JVP difficult to assess due to body habitus , brisk carotid upstroke bilaterally  CHEST: lungs clear to auscultation without rales, rhonchi or wheezes, no use of accessory muscles, no retractions  CARDIOVASCULAR: regular rhythm, normal S1 and S2, no S3 or S4 and no murmur, click or rub.  ABDOMEN: soft, non tender, without hepatosplenomegaly, no masses palpable, bowel sounds normal  EXTREMITIES: warm, trace LE edema, DP/PT pulses 2+ bilaterally, no clubbing or cyanosis   NEURO: alert and oriented to person/place/time, normal speech, gait and affect  SKIN: no ecchymoses, no rashes      Laboratory:  Last Comprehensive Metabolic Panel:  Lab Results   Component Value Date     2024    POTASSIUM 3.4 2024    CHLORIDE 104 2024    CO2 21 (L) 2024    ANIONGAP 16 (H) 2024     (H) 2024    BUN 45.7 (H) 2024    CR 2.85 (H) 2024    GFRESTIMATED 24 (L) 2024    RYAN 9.4 2024       Last CBC:  CBC RESULTS:   Recent Labs   Lab Test 24   WBC 9.4   RBC 5.19   HGB 15.4   HCT 44.7   MCV 86   MCH 29.7   MCHC 34.5   RDW 14.1          24 EK/11/24 Echocardiogram:  Global and regional left ventricular function is normal with an EF of 60-65%.  Severe concentric wall thickening consistent  with left ventricular hypertrophy  is present.  Right ventricular function, chamber size, wall motion, and thickness are  normal.  Pulmonary artery systolic pressure cannot be assessed.  The inferior vena cava is normal.  No pericardial effusion is present.  There is no prior study for direct comparison      Assessment and Plan:     # Coronary Artery Disease   # s/p CABG (2015)  # Hx of MI (2011) w/ Prior PCI  # Hyperlipidemia   Known history of CAD with CABG x 4 in 2015. Has not had ischemic evaluation since. Most recent lipid panel on 3/22/23 with total 131, LDL 70, . He is on statin therapy. He denies chest pain, dizziness, lightheadedness, orthopnea, edema, or PND. Has some shortness of breath right now as he is getting over a cold.  Risk factors for CAD include hypertension , obesity, diabetes, male age >45, previous smoker, sedentary lifestyle, and high cholesterol (LDL).   - Continue Aspirin 81mg daily   - Continue Lipitor 40mg q HS   - Obtain updated lipid panel   - Ischemic evaluation with coronary angiogram once closer to transplant or on dialysis   - Follow-up with me 2 weeks after procedure; telephone, virtual, or in person OK     # Atrial Fibrillation  # s/p Watchman (5/2022)  History of AF with Watchman placement no longer required to be on AC. Is in NSR rhythm today.   - Continue to monitor     # Hypertension  Blood pressure in office today 134/71. Blood pressures on home average 130-140's/70's.   - Continue Chlorthalidone 25mg daily   - Continue Toprol XL 100mg daily   - Monitor BP's at home. Keep a log of readings and bring to follow up appointments  - BP goal < 130/80    # Diabetes Mellitus Type 2  Most recent A1c per chart review 9.2% on 10/2/24. Home regimen includes Lantus 30 units at HS, Humulin 30 units daily. Does not currently have equipment to check blood sugars but recently got a continuous BG monitoring system he will start using. He denies hyper/hypoglycemic unawareness.   -  Educated on importance of diabetic control in preventing progression of heart and kidney disease  - Outpatient follow up with endocrinologist as previously recommended;  recently started seeing one    # Obesity (BMI 37.93)   Per patient statement weight has been down trending over the past year Lost about 10 lb over the last year.  - Educated patient on the importance of healthy diet and exercise in reducing risk for heart disease   - Encouraged 150 minutes/week of moderate intensity exercise   - Encouraged healthy weight loss; discussed Mediterranean diet     # ESRD not yet on Dialysis  Was briefly on dialysis when he had COVID. He is not currently on dialysis. He admits to being compliant with all medications. Denies uremic symptoms; pruritus, nausea, vomiting.  - Additional labs / follow-up / testing per SOT team      CAD Risk Level:  High Risk: > 3 risk factors, OR diabetic kidney disease, OR DM > 10 years, known CAD or PAD, prior PCI or CABG, on dialysis 5 years or more           RCRI Score:   - High risk surgery (>5% cardiac complication risk) = 1 point   - Coronary Artery Disease (MI, positive stress test, angina, Qs on EKG) = 1 point   - Diabetes Mellitus (on Insulin) = 1 point   - Serum Creatinine >2.0 mg/dl = 1 point        I have reviewed and updated the patient's Past Medical History, Social History, Family History and Medication List.       ANAYA Martinez, CNP  [facility] Cardiology      Review of prior external note(s) from - care everywhere, SOT, nephrology  Review of the result(s) of each unique test - echocardiogram, EKG, CT  Ordering of each unique test  Prescription drug management- medication reconciliation     30 minutes spent by me on the date of the encounter doing chart review, review of outside records, review of test results, patient visit, and documentation       ANAYA Jackson CNP on 12/18/2024 at 9:49 AM      Again, thank you for allowing me to participate in the care of  your patient.        Sincerely,        ANAYA Jackson CNP

## 2025-02-02 NOTE — PROGRESS NOTES
[facility]  [address] [place] [address]  Phone: [phone], Fax: [phone]    Outpatient Visit Note:    Patient: Zane King  MRN: [id number]  : 1960  RAFAEL: Feb 3, 2025    Reason for consult: Hx of DVT. Needs heme clearance for SOT. transplant surgeon wants recs on anticoagulation around surgery     History of Present Illness:  Zane King is a 64 year old man with a history of diabetes, CKD, CAD s/p CABG , DVT, atrial fibrillation s/p watchman , prior smoking, obesity, referred for evaluation in advance of kidney transplant.    He quit smoking approximately 20 years ago.  He reportedly had a provoked DVT in 2021 due to immobility after a prolonged hospitalization.    He reports that he was in the hospital for COVID around .  Review of records note that he was admitted in 2021, intubated for 21 days, ultimately extubated on May 11.  He required CRRT and had multiple additional infections.    On review of imaging, on May 7 in  lower extremity ultrasound showed an occlusive DVT within the gastrocnemius vein in the proximal calf and an intramuscular vein in the mid calf, and on the left a DVT within the gastrocnemius vein in the proximal/mid calf as well as nonocclusive DVT within the tibial vein in the proximal calf.  A subsequent ultrasound on  showed no evidence of DVT.    No current smoking. 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    Due for melanoma resection next week.    Medications:  Current Outpatient Medications   Medication Sig Dispense Refill    amLODIPine (NORVASC) 10 MG tablet Take 1 tablet by mouth daily.      aspirin 81 MG EC tablet Take 81 mg by mouth.      atorvastatin (LIPITOR) 40 MG tablet Take 1 tablet by mouth at bedtime.      calcitRIOL (ROCALTROL) 0.25 MCG capsule Take 0.25 mcg by mouth.      chlorthalidone (HYGROTON) 25 MG tablet Take 25 mg by mouth.      gabapentin  (NEURONTIN) 100 MG capsule Take 1 capsule by mouth at bedtime.      insulin glargine (LANTUS VIAL) 100 UNIT/ML vial Inject 30 Units subcutaneously at bedtime.      insulin NPH-Regular 70/30 (HUMULIN 70/30;NOVOLIN 70/30) (70-30) 100 UNIT/ML vial Inject 30 Units subcutaneously.      metoprolol succinate ER (TOPROL XL) 100 MG 24 hr tablet Take 100 mg by mouth.      Testosterone 1.62 % GEL APPLY 1 PUMP TRANSDERMALLY ONCE DAILY      Vitamin D3 50 mcg (2000 units) tablet Take 2,000 Units by mouth.          Objectives:  N/a, video visit    Assessment:  Zane King is a 64 year old man pending kidney transplant with a history of distal lower extremity DVTs in the context of critical illness.    His distal lower extremity DVTs occurred several years ago while admitted for a prolonged period of time with ARDS.  These are likely provoked in the context of a severe illness and prolonged hospitalization.  He has been off of anticoagulation for several years without recurrence.    We discussed that I think he can continue off of anticoagulation, and there is no need for further testing at this time.  If he is to receive a kidney, he should receive standard postoperative VTE prophylaxis as per the transplant surgery protocols, but does not need any special additional management based on his history.    Plan:  - Continue off of anticoagulation  - Standard VTE prophylaxis after transplant  - Follow-up as needed    Patient understands and agrees with the above plan and recommendation.    Jacob Cogan, MD  Hematology    60 minutes spent by me on the date of the encounter doing chart review, history and exam, documentation and further activities per the note    The longitudinal plan of care for the diagnosis(es)/condition(s) as documented were addressed during this visit. Due to the added complexity in care, I will continue to support Weston in the subsequent management and with ongoing continuity of care.    This note was  completed in part using dictation via the Dragon voice recognition software. Some word and grammatical errors may occur and must be interpreted in the appropriate clinical context. If there are any questions pertaining to this issue, please contact me for further clarification.     Video-Visit Details:  Type of service:  Video Visit  Video Start Time: 12:00 PM  Video End Time (time video stopped): 12:15  Originating Location (pt. Location): Home  Distant Location (provider location):  [facility]   Mode of Communication:  Video Conference via [employer]

## 2025-02-03 ENCOUNTER — VIRTUAL VISIT (OUTPATIENT)
Dept: HEMATOLOGY | Facility: CLINIC | Age: 65
End: 2025-02-03
Attending: NURSE PRACTITIONER
Payer: COMMERCIAL

## 2025-02-03 DIAGNOSIS — Z76.82 ORGAN TRANSPLANT CANDIDATE: ICD-10-CM

## 2025-02-03 DIAGNOSIS — Z79.4 TYPE 2 DIABETES MELLITUS WITH STAGE 4 CHRONIC KIDNEY DISEASE, WITH LONG-TERM CURRENT USE OF INSULIN (H): ICD-10-CM

## 2025-02-03 DIAGNOSIS — I25.810 CORONARY ARTERY DISEASE INVOLVING CORONARY BYPASS GRAFT OF NATIVE HEART WITHOUT ANGINA PECTORIS: ICD-10-CM

## 2025-02-03 DIAGNOSIS — I10 ESSENTIAL HYPERTENSION: ICD-10-CM

## 2025-02-03 DIAGNOSIS — E66.812 CLASS 2 SEVERE OBESITY DUE TO EXCESS CALORIES WITH SERIOUS COMORBIDITY AND BODY MASS INDEX (BMI) OF 37.0 TO 37.9 IN ADULT (H): ICD-10-CM

## 2025-02-03 DIAGNOSIS — I25.10 CARDIOVASCULAR DISEASE: ICD-10-CM

## 2025-02-03 DIAGNOSIS — Z01.818 PRE-TRANSPLANT EVALUATION FOR KIDNEY TRANSPLANT: ICD-10-CM

## 2025-02-03 DIAGNOSIS — E66.01 CLASS 2 SEVERE OBESITY DUE TO EXCESS CALORIES WITH SERIOUS COMORBIDITY AND BODY MASS INDEX (BMI) OF 37.0 TO 37.9 IN ADULT (H): ICD-10-CM

## 2025-02-03 DIAGNOSIS — E11.22 TYPE 2 DIABETES MELLITUS WITH STAGE 4 CHRONIC KIDNEY DISEASE, WITH LONG-TERM CURRENT USE OF INSULIN (H): ICD-10-CM

## 2025-02-03 DIAGNOSIS — N18.4 TYPE 2 DIABETES MELLITUS WITH STAGE 4 CHRONIC KIDNEY DISEASE, WITH LONG-TERM CURRENT USE OF INSULIN (H): ICD-10-CM

## 2025-02-03 DIAGNOSIS — N18.4 CHRONIC KIDNEY DISEASE, STAGE 4, SEVERELY DECREASED GFR (H): ICD-10-CM

## 2025-02-03 DIAGNOSIS — Z87.891 HISTORY OF TOBACCO USE: ICD-10-CM

## 2025-02-03 DIAGNOSIS — Z12.5 ENCOUNTER FOR SCREENING FOR MALIGNANT NEOPLASM OF PROSTATE: ICD-10-CM

## 2025-02-03 DIAGNOSIS — Z76.82 AWAITING ORGAN TRANSPLANT: ICD-10-CM

## 2025-02-03 PROCEDURE — 98003 SYNCH AUDIO-VIDEO NEW HI 60: CPT | Performed by: STUDENT IN AN ORGANIZED HEALTH CARE EDUCATION/TRAINING PROGRAM

## 2025-02-11 ENCOUNTER — TEAM CONFERENCE (OUTPATIENT)
Dept: TRANSPLANT | Facility: CLINIC | Age: 65
End: 2025-02-11
Payer: COMMERCIAL

## 2025-02-11 NOTE — TELEPHONE ENCOUNTER
Image Review Meeting    ATTENDEES: Dr. Snyder    IMAGES REVIEWED: CT A/P and iliacs from 12/18/2024    DECISION: Vessels suitable for kidney transplant     INCIDENTALS: Yes: Indeterminant 1.3 exophytic cyst right renal lesion. Per neph and surgery - needs MRI.

## 2025-02-16 ENCOUNTER — HEALTH MAINTENANCE LETTER (OUTPATIENT)
Age: 65
End: 2025-02-16

## 2025-03-16 ENCOUNTER — HEALTH MAINTENANCE LETTER (OUTPATIENT)
Age: 65
End: 2025-03-16

## 2025-05-18 ENCOUNTER — HEALTH MAINTENANCE LETTER (OUTPATIENT)
Age: 65
End: 2025-05-18

## 2025-07-29 ENCOUNTER — TELEPHONE (OUTPATIENT)
Dept: TRANSPLANT | Facility: CLINIC | Age: 65
End: 2025-07-29
Payer: COMMERCIAL

## 2025-07-29 DIAGNOSIS — N28.9 LESION OF RIGHT NATIVE KIDNEY: ICD-10-CM

## 2025-07-29 DIAGNOSIS — Z76.82 ORGAN TRANSPLANT CANDIDATE: ICD-10-CM

## 2025-07-29 DIAGNOSIS — N18.6 ESRD (END STAGE RENAL DISEASE) (H): Primary | ICD-10-CM

## 2025-07-29 NOTE — TELEPHONE ENCOUNTER
Called pt to update reviewed need for renal MRI with neph PAT, Lisa Hui. Pt need renal MRI without contrast (order placed). Provided central scheduling number for pt to call to arrange. Waiting to hear back from Derm on wait time. Pt verbalized understanding of information and has no further questions. Encouraged to reach out if questions arise.

## 2025-07-31 ENCOUNTER — MEDICAL CORRESPONDENCE (OUTPATIENT)
Dept: HEALTH INFORMATION MANAGEMENT | Facility: CLINIC | Age: 65
End: 2025-07-31
Payer: COMMERCIAL

## 2025-08-06 ENCOUNTER — COMMITTEE REVIEW (OUTPATIENT)
Dept: TRANSPLANT | Facility: CLINIC | Age: 65
End: 2025-08-06
Payer: COMMERCIAL

## 2025-08-06 ENCOUNTER — TELEPHONE (OUTPATIENT)
Dept: TRANSPLANT | Facility: CLINIC | Age: 65
End: 2025-08-06
Payer: COMMERCIAL

## 2025-08-31 ENCOUNTER — HEALTH MAINTENANCE LETTER (OUTPATIENT)
Age: 65
End: 2025-08-31

## 2025-09-03 ENCOUNTER — TELEPHONE (OUTPATIENT)
Dept: TRANSPLANT | Facility: CLINIC | Age: 65
End: 2025-09-03
Payer: COMMERCIAL